# Patient Record
Sex: MALE | Race: BLACK OR AFRICAN AMERICAN | NOT HISPANIC OR LATINO | URBAN - METROPOLITAN AREA
[De-identification: names, ages, dates, MRNs, and addresses within clinical notes are randomized per-mention and may not be internally consistent; named-entity substitution may affect disease eponyms.]

---

## 2018-09-24 ENCOUNTER — EMERGENCY (EMERGENCY)
Facility: HOSPITAL | Age: 53
LOS: 0 days | Discharge: HOME | End: 2018-09-24
Attending: EMERGENCY MEDICINE
Payer: MEDICAID

## 2018-09-24 VITALS
SYSTOLIC BLOOD PRESSURE: 148 MMHG | DIASTOLIC BLOOD PRESSURE: 109 MMHG | HEIGHT: 72 IN | RESPIRATION RATE: 16 BRPM | TEMPERATURE: 98 F | HEART RATE: 87 BPM | WEIGHT: 270.07 LBS | OXYGEN SATURATION: 97 %

## 2018-09-24 DIAGNOSIS — M54.5 LOW BACK PAIN: ICD-10-CM

## 2018-09-24 DIAGNOSIS — M25.571 PAIN IN RIGHT ANKLE AND JOINTS OF RIGHT FOOT: ICD-10-CM
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
## 2018-09-24 DIAGNOSIS — S39.012A STRAIN OF MUSCLE, FASCIA AND TENDON OF LOWER BACK, INITIAL ENCOUNTER: ICD-10-CM

## 2018-09-24 PROCEDURE — 99283 EMERGENCY DEPT VISIT LOW MDM: CPT

## 2018-09-24 PROCEDURE — 72100 X-RAY EXAM L-S SPINE 2/3 VWS: CPT | Mod: 26

## 2018-09-24 PROCEDURE — 73610 X-RAY EXAM OF ANKLE: CPT | Mod: 26,RT

## 2018-09-24 RX ORDER — IBUPROFEN 200 MG
600 TABLET ORAL ONCE
Refills: 0 | Status: COMPLETED | OUTPATIENT
Start: 2018-09-24 | End: 2018-09-24

## 2018-09-24 RX ADMIN — Medication 600 MILLIGRAM(S): at 16:08

## 2018-09-24 NOTE — ED PROVIDER NOTE - MUSCULOSKELETAL, MLM
Spine appears normal, range of motion is not limited, right lateral ankle mild tenderness, right distal lateral lumbar tenderness

## 2018-09-24 NOTE — ED ADULT TRIAGE NOTE - CHIEF COMPLAINT QUOTE
slipped and fell in water in [facility] Bathroom, twisted ankle and c/o right ankle and right flank and back pains.

## 2021-12-28 ENCOUNTER — EMERGENCY (EMERGENCY)
Facility: HOSPITAL | Age: 56
LOS: 1 days | Discharge: HOME | End: 2021-12-28
Attending: EMERGENCY MEDICINE | Admitting: EMERGENCY MEDICINE
Payer: COMMERCIAL

## 2021-12-28 VITALS
OXYGEN SATURATION: 100 % | RESPIRATION RATE: 20 BRPM | WEIGHT: 255.07 LBS | DIASTOLIC BLOOD PRESSURE: 98 MMHG | SYSTOLIC BLOOD PRESSURE: 149 MMHG | TEMPERATURE: 98 F | HEART RATE: 66 BPM

## 2021-12-28 PROCEDURE — 99284 EMERGENCY DEPT VISIT MOD MDM: CPT

## 2021-12-28 NOTE — ED PROVIDER NOTE - ATTENDING CONTRIBUTION TO CARE
Pt was seen and evaluated by me. Pt is a 57 y/o male with PMHx of HTN who presented to the ED for body aches X 2 days. Pt states he was exposed to someone who tested + to COVID. Pt states since having some weakness and body aches. Pt denies any nausea, vomiting, SOB, chest pain, or abd pain. Denies any diarrhea. Lungs CTA b/l. RRR. Abd soft, non-tender. FROM of b/l UE and LE.  Concern for URI/COVID  Swab

## 2021-12-28 NOTE — ED PROVIDER NOTE - NSFOLLOWUPINSTRUCTIONS_ED_ALL_ED_FT
Follow up with your Primary Medical Doctor.  Rest.  Drink plenty of fluids.  Take Tylenol 650mg orally every 6 hours as needed for fever.  Self quarantine as discussed per CDC guidelines if COVID positive.   It is very important to be diligent about hand washing & hygiene to avoid spreading the illness to others.  Return to the ER for any persistent/worsening or new symptoms chest pain, shortness of breath, unable to eat/drink high fever or any concerning symptoms.  See attached COVID pamphlet.

## 2021-12-28 NOTE — ED PROVIDER NOTE - PATIENT PORTAL LINK FT
You can access the FollowMyHealth Patient Portal offered by [facility] by registering at the following website: [email or web address]. By joining [facility]’s FollowMyHealth portal, you will also be able to view your health information using other applications (apps) compatible with our system.

## 2021-12-28 NOTE — ED PROVIDER NOTE - OBJECTIVE STATEMENT
57 y/o male with a hx of HTN presents to the ER c/o 2 days of fatigue and bodyaches.  Pt states he was exposed to someone who is COVID+.  Pt denies chest pain, cough, fevers, sob, nausea, vomiting, abdominal pain. 55 y/o male with a hx of HTN presents to the ER c/o 2 days of fatigue and body aches.  Pt states he was exposed to someone who is COVID+.  Pt denies chest pain, cough, fevers, sob, nausea, vomiting, abdominal pain. Pt is vaccinated.

## 2021-12-28 NOTE — ED PROVIDER NOTE - CPE EDP PSYCH NORM
Chronic problem. Will continue chronic medication(s), Wellbutrin and Chantix and monitor for any changes, adjusting as needed.        normal...

## 2021-12-28 NOTE — ED ADULT TRIAGE NOTE - CHIEF COMPLAINT QUOTE
onset 2 days ago: fever, runny nose, headache, slight sore throat. unknown exposure to COVID    PMH includes HTN

## 2021-12-28 NOTE — ED PROVIDER NOTE - CLINICAL SUMMARY MEDICAL DECISION MAKING FREE TEXT BOX
57 y/o male with a hx of HTN presents to the ER c/o 2 days of fatigue and bodyaches.  Pt states he was exposed to someone who is COVID+. Pt is well appearing, NAD, lungs CTA, likely viral, will obtain COVID swab.

## 2021-12-29 LAB — SARS-COV-2 RNA SPEC QL NAA+PROBE: SIGNIFICANT CHANGE UP

## 2022-04-13 ENCOUNTER — EMERGENCY (EMERGENCY)
Facility: HOSPITAL | Age: 57
LOS: 1 days | Discharge: HOME | End: 2022-04-13
Attending: STUDENT IN AN ORGANIZED HEALTH CARE EDUCATION/TRAINING PROGRAM | Admitting: STUDENT IN AN ORGANIZED HEALTH CARE EDUCATION/TRAINING PROGRAM
Payer: MEDICAID

## 2022-04-13 VITALS
HEART RATE: 92 BPM | TEMPERATURE: 98 F | OXYGEN SATURATION: 100 % | DIASTOLIC BLOOD PRESSURE: 99 MMHG | RESPIRATION RATE: 18 BRPM | SYSTOLIC BLOOD PRESSURE: 130 MMHG

## 2022-04-13 VITALS
SYSTOLIC BLOOD PRESSURE: 144 MMHG | OXYGEN SATURATION: 100 % | RESPIRATION RATE: 18 BRPM | HEART RATE: 78 BPM | DIASTOLIC BLOOD PRESSURE: 104 MMHG

## 2022-04-13 PROCEDURE — 99284 EMERGENCY DEPT VISIT MOD MDM: CPT

## 2022-04-13 NOTE — ED ADULT NURSE NOTE - NS ED NURSE ELOPE COMMENTS
When entering the room to place IV for blood draw, pt was not there, empty stretcher. When entering the room to place IV for blood draw, pt was not to be found. There was an empty stretcher in the room.

## 2022-04-13 NOTE — ED ADULT NURSE NOTE - OBJECTIVE STATEMENT
received pt to rm 9, A&xo4, amb. 57y/o male hx of HTN complaining of insomnia. pt states he had spinal fusion surgery and since then has not slept a full night sleep in a month. Sleep approx 1-2 hours a night. Denies weakness, dizziness, abdominal pain, fever/chills, nausea/vomiting, chest pain.

## 2022-04-13 NOTE — ED ADULT NURSE NOTE - CHIEF COMPLAINT QUOTE
Pt arrives with wife c/o sob and not sleeping x1 month. Wife reports tonight "pt appeared not like himself more tired". Pt offers no other complaints. Denies fever, chills, psych hx/daily meds, denies drugs/ETOH. PMHX HTN.

## 2022-04-13 NOTE — ED ADULT TRIAGE NOTE - CHIEF COMPLAINT QUOTE
Pt arrives with wife c/o sob and not sleeping x1 month. Wife reports tonight "pt appeared more withdrawn, not like himself". Pt offers no other complaints. Denies fever, chills, psych hx/daily meds, denies drugs/ETOH. PMHX HTN. Pt arrives with wife c/o sob and not sleeping x1 month. Wife reports tonight "pt appeared not like himself more tired". Pt offers no other complaints. Denies fever, chills, psych hx/daily meds, denies drugs/ETOH. PMHX HTN.

## 2022-04-13 NOTE — ED ADULT NURSE NOTE - NSIMPLEMENTINTERV_GEN_ALL_ED
Implemented All Universal Safety Interventions:  [place] to call system. Call bell, personal items and telephone within reach. Instruct patient to call for assistance. Room bathroom lighting operational. Non-slip footwear when patient is off stretcher. Physically safe environment: no spills, clutter or unnecessary equipment. Stretcher in lowest position, wheels locked, appropriate side rails in place.

## 2022-04-14 NOTE — ED PROVIDER NOTE - OBJECTIVE STATEMENT
57 yo M with pmh of HTN, lumbar fusion 1 month ago presenting with insomnia. Patient has had chronic insomnia over many years but since coming home from hospital after surgery 1 month ago has not been able to sleep. States that he sleeps 1-2 hours a day and it is debilitating. Because of this presented today. Noticed that he feels mildly sob when he paces at night when he cannot sleep but otherwise denies LE swelling, chest pain, n/v/d. Is requesting medication for sleep.

## 2022-04-14 NOTE — ED PROVIDER NOTE - NS ED ROS FT
Gen: Denies fever, chills, generalized weakness  CV: Denies chest pain, palpitations  HEENT: Denies neck pain, headache, vision changes  Skin: Denies rash, erythema, color changes  Resp: + mild SOB, denies cough  Endo: Denies sensitivity to heat, cold, increased urination  GI: Denies constipation, nausea, vomiting, diarrhea  Msk: Denies back pain, LE swelling, extremity pain  : Denies dysuria, increased frequency  Neuro: Denies LOC, focal weakness, numbness, tingling  Psych: Denies hx of psych, SI, HI

## 2022-04-14 NOTE — ED PROVIDER NOTE - ATTENDING CONTRIBUTION TO CARE
Dr. Mcbride: This patient was evaluated by the resident and workup was initiated prior to my arrival in the ED. This patient was never seen nor evaluated by myself. Patient reported to have ambulated without assistance from the ED. Multiple attempts made to call the patient over head without response. Patient eloped from the emergency department prior to evaluation.

## 2022-04-14 NOTE — ED PROVIDER NOTE - CLINICAL SUMMARY MEDICAL DECISION MAKING FREE TEXT BOX
Joseph Frankel PGY3: Patient with chronic insomnia, worse in past month. VSS. Patient looks well and is non toxic appearing. PE as above. Likely related to chronic insomnia. However consider underlying metabolic etiologies which may contribute to patient's condition. Patient does not appear SOB or fluid overloaded. Will get labs, ecg, cxr, and reassess. If no etiology is found will likely require outpatient follow up with PMD and sleep specialist.

## 2022-04-14 NOTE — ED PROVIDER NOTE - PROGRESS NOTE DETAILS
Joseph Frankel PGY3: Patient eloped without being seen by attending physician. Patient was ambulatory without assistance. Attempted to contact patient multiple times, but unsuccessful.

## 2022-04-14 NOTE — ED PROVIDER NOTE - PHYSICAL EXAMINATION
Gen: Alert and oriented. Lying comfortably in bed. Answering questions appropriately  HEENT: extra occular movements intact, no nasal discharge, mucous membranes moist  CV: Regular rate and rhythm, +S1/S2, no murmurs/rubs/gallops,   Resp: Clear to ausculation bilaterally, no wheezes/rhonchi/rales  GI: Abdomen soft non-distended, non tender to palpation, no masses  MSK: No open wounds, no bruising, no LE edema, Homans sign negative bl  Neuro: A&Ox4, following commands, moving all four extremities spontaneously, ambulating without assistance  Psych: appropriate mood

## 2022-12-22 ENCOUNTER — INPATIENT (INPATIENT)
Facility: HOSPITAL | Age: 57
LOS: 4 days | Discharge: HOME | DRG: 67 | End: 2022-12-27
Attending: NEUROLOGICAL SURGERY | Admitting: NEUROLOGICAL SURGERY
Payer: MEDICAID

## 2022-12-22 VITALS
RESPIRATION RATE: 18 BRPM | OXYGEN SATURATION: 98 % | TEMPERATURE: 99 F | DIASTOLIC BLOOD PRESSURE: 81 MMHG | HEART RATE: 76 BPM | SYSTOLIC BLOOD PRESSURE: 131 MMHG

## 2022-12-22 DIAGNOSIS — Z98.1 ARTHRODESIS STATUS: Chronic | ICD-10-CM

## 2022-12-22 DIAGNOSIS — K62.89 OTHER SPECIFIED DISEASES OF ANUS AND RECTUM: ICD-10-CM

## 2022-12-22 DIAGNOSIS — I10 ESSENTIAL (PRIMARY) HYPERTENSION: ICD-10-CM

## 2022-12-22 DIAGNOSIS — I63.9 CEREBRAL INFARCTION, UNSPECIFIED: ICD-10-CM

## 2022-12-22 DIAGNOSIS — I65.01 OCCLUSION AND STENOSIS OF RIGHT VERTEBRAL ARTERY: ICD-10-CM

## 2022-12-22 DIAGNOSIS — Z29.9 ENCOUNTER FOR PROPHYLACTIC MEASURES, UNSPECIFIED: ICD-10-CM

## 2022-12-22 LAB
A1C WITH ESTIMATED AVERAGE GLUCOSE RESULT: 5.9 % — HIGH (ref 4–5.6)
ANION GAP SERPL CALC-SCNC: 10 MMOL/L — SIGNIFICANT CHANGE UP (ref 5–17)
APTT BLD: 26.3 SEC — LOW (ref 27.5–35.5)
BUN SERPL-MCNC: 9 MG/DL — SIGNIFICANT CHANGE UP (ref 7–23)
CALCIUM SERPL-MCNC: 9 MG/DL — SIGNIFICANT CHANGE UP (ref 8.4–10.5)
CHLORIDE SERPL-SCNC: 103 MMOL/L — SIGNIFICANT CHANGE UP (ref 96–108)
CHOLEST SERPL-MCNC: 173 MG/DL — SIGNIFICANT CHANGE UP
CO2 SERPL-SCNC: 23 MMOL/L — SIGNIFICANT CHANGE UP (ref 22–31)
CREAT SERPL-MCNC: 1.02 MG/DL — SIGNIFICANT CHANGE UP (ref 0.5–1.3)
EGFR: 86 ML/MIN/1.73M2 — SIGNIFICANT CHANGE UP
ESTIMATED AVERAGE GLUCOSE: 123 MG/DL — HIGH (ref 68–114)
GLUCOSE SERPL-MCNC: 94 MG/DL — SIGNIFICANT CHANGE UP (ref 70–99)
HCT VFR BLD CALC: 36.9 % — LOW (ref 39–50)
HDLC SERPL-MCNC: 36 MG/DL — LOW
HGB BLD-MCNC: 12.6 G/DL — LOW (ref 13–17)
INR BLD: 1.27 — HIGH (ref 0.88–1.16)
LIPID PNL WITH DIRECT LDL SERPL: 118 MG/DL — HIGH
MCHC RBC-ENTMCNC: 29 PG — SIGNIFICANT CHANGE UP (ref 27–34)
MCHC RBC-ENTMCNC: 34.1 GM/DL — SIGNIFICANT CHANGE UP (ref 32–36)
MCV RBC AUTO: 85 FL — SIGNIFICANT CHANGE UP (ref 80–100)
NON HDL CHOLESTEROL: 137 MG/DL — HIGH
NRBC # BLD: 0 /100 WBCS — SIGNIFICANT CHANGE UP (ref 0–0)
PLATELET # BLD AUTO: 173 K/UL — SIGNIFICANT CHANGE UP (ref 150–400)
POTASSIUM SERPL-MCNC: 3.9 MMOL/L — SIGNIFICANT CHANGE UP (ref 3.5–5.3)
POTASSIUM SERPL-SCNC: 3.9 MMOL/L — SIGNIFICANT CHANGE UP (ref 3.5–5.3)
PROTHROM AB SERPL-ACNC: 15.1 SEC — HIGH (ref 10.5–13.4)
RBC # BLD: 4.34 M/UL — SIGNIFICANT CHANGE UP (ref 4.2–5.8)
RBC # FLD: 14 % — SIGNIFICANT CHANGE UP (ref 10.3–14.5)
SARS-COV-2 RNA SPEC QL NAA+PROBE: NEGATIVE — SIGNIFICANT CHANGE UP
SODIUM SERPL-SCNC: 136 MMOL/L — SIGNIFICANT CHANGE UP (ref 135–145)
TRIGL SERPL-MCNC: 95 MG/DL — SIGNIFICANT CHANGE UP
WBC # BLD: 5.28 K/UL — SIGNIFICANT CHANGE UP (ref 3.8–10.5)
WBC # FLD AUTO: 5.28 K/UL — SIGNIFICANT CHANGE UP (ref 3.8–10.5)

## 2022-12-22 PROCEDURE — 93925 LOWER EXTREMITY STUDY: CPT | Mod: 26

## 2022-12-22 PROCEDURE — 99233 SBSQ HOSP IP/OBS HIGH 50: CPT | Mod: GC

## 2022-12-22 PROCEDURE — 99254 IP/OBS CNSLTJ NEW/EST MOD 60: CPT

## 2022-12-22 PROCEDURE — 93306 TTE W/DOPPLER COMPLETE: CPT | Mod: 26

## 2022-12-22 PROCEDURE — 73706 CT ANGIO LWR EXTR W/O&W/DYE: CPT | Mod: 26,50

## 2022-12-22 PROCEDURE — 99233 SBSQ HOSP IP/OBS HIGH 50: CPT

## 2022-12-22 PROCEDURE — 99221 1ST HOSP IP/OBS SF/LOW 40: CPT | Mod: GC

## 2022-12-22 PROCEDURE — 70450 CT HEAD/BRAIN W/O DYE: CPT | Mod: 26

## 2022-12-22 PROCEDURE — 93970 EXTREMITY STUDY: CPT | Mod: 26

## 2022-12-22 PROCEDURE — 99252 IP/OBS CONSLTJ NEW/EST SF 35: CPT | Mod: GC

## 2022-12-22 PROCEDURE — 76376 3D RENDER W/INTRP POSTPROCES: CPT | Mod: 26

## 2022-12-22 PROCEDURE — 99223 1ST HOSP IP/OBS HIGH 75: CPT

## 2022-12-22 RX ORDER — ASPIRIN/CALCIUM CARB/MAGNESIUM 324 MG
81 TABLET ORAL DAILY
Refills: 0 | Status: DISCONTINUED | OUTPATIENT
Start: 2022-12-22 | End: 2022-12-25

## 2022-12-22 RX ORDER — ASPIRIN/CALCIUM CARB/MAGNESIUM 324 MG
325 TABLET ORAL DAILY
Refills: 0 | Status: DISCONTINUED | OUTPATIENT
Start: 2022-12-22 | End: 2022-12-22

## 2022-12-22 RX ORDER — POLYETHYLENE GLYCOL 3350 17 G/17G
17 POWDER, FOR SOLUTION ORAL DAILY
Refills: 0 | Status: DISCONTINUED | OUTPATIENT
Start: 2022-12-22 | End: 2022-12-27

## 2022-12-22 RX ORDER — SOD SULF/SODIUM/NAHCO3/KCL/PEG
4000 SOLUTION, RECONSTITUTED, ORAL ORAL ONCE
Refills: 0 | Status: COMPLETED | OUTPATIENT
Start: 2022-12-22 | End: 2022-12-22

## 2022-12-22 RX ORDER — ONDANSETRON 8 MG/1
4 TABLET, FILM COATED ORAL EVERY 6 HOURS
Refills: 0 | Status: DISCONTINUED | OUTPATIENT
Start: 2022-12-22 | End: 2022-12-27

## 2022-12-22 RX ORDER — ACETAMINOPHEN 500 MG
650 TABLET ORAL EVERY 6 HOURS
Refills: 0 | Status: DISCONTINUED | OUTPATIENT
Start: 2022-12-22 | End: 2022-12-27

## 2022-12-22 RX ORDER — SENNA PLUS 8.6 MG/1
2 TABLET ORAL AT BEDTIME
Refills: 0 | Status: DISCONTINUED | OUTPATIENT
Start: 2022-12-22 | End: 2022-12-27

## 2022-12-22 RX ORDER — CLOPIDOGREL BISULFATE 75 MG/1
75 TABLET, FILM COATED ORAL DAILY
Refills: 0 | Status: DISCONTINUED | OUTPATIENT
Start: 2022-12-22 | End: 2022-12-22

## 2022-12-22 RX ORDER — SODIUM CHLORIDE 9 MG/ML
1000 INJECTION INTRAMUSCULAR; INTRAVENOUS; SUBCUTANEOUS
Refills: 0 | Status: DISCONTINUED | OUTPATIENT
Start: 2022-12-22 | End: 2022-12-22

## 2022-12-22 RX ORDER — AMLODIPINE BESYLATE 2.5 MG/1
1 TABLET ORAL
Qty: 0 | Refills: 0 | DISCHARGE

## 2022-12-22 RX ORDER — ENOXAPARIN SODIUM 100 MG/ML
40 INJECTION SUBCUTANEOUS
Refills: 0 | Status: DISCONTINUED | OUTPATIENT
Start: 2022-12-22 | End: 2022-12-22

## 2022-12-22 RX ORDER — ATORVASTATIN CALCIUM 80 MG/1
80 TABLET, FILM COATED ORAL AT BEDTIME
Refills: 0 | Status: DISCONTINUED | OUTPATIENT
Start: 2022-12-22 | End: 2022-12-27

## 2022-12-22 RX ORDER — HEPARIN SODIUM 5000 [USP'U]/ML
1200 INJECTION INTRAVENOUS; SUBCUTANEOUS
Qty: 25000 | Refills: 0 | Status: DISCONTINUED | OUTPATIENT
Start: 2022-12-22 | End: 2022-12-23

## 2022-12-22 RX ADMIN — POLYETHYLENE GLYCOL 3350 17 GRAM(S): 17 POWDER, FOR SOLUTION ORAL at 12:18

## 2022-12-22 RX ADMIN — SENNA PLUS 2 TABLET(S): 8.6 TABLET ORAL at 21:48

## 2022-12-22 RX ADMIN — HEPARIN SODIUM 1200 UNIT(S)/HR: 5000 INJECTION INTRAVENOUS; SUBCUTANEOUS at 19:43

## 2022-12-22 RX ADMIN — Medication 81 MILLIGRAM(S): at 18:26

## 2022-12-22 RX ADMIN — Medication 4000 MILLILITER(S): at 18:29

## 2022-12-22 RX ADMIN — ATORVASTATIN CALCIUM 80 MILLIGRAM(S): 80 TABLET, FILM COATED ORAL at 21:48

## 2022-12-22 RX ADMIN — Medication 325 MILLIGRAM(S): at 12:18

## 2022-12-22 NOTE — CONSULT NOTE ADULT - SUBJECTIVE AND OBJECTIVE BOX
Surgery Consult Note    HPI:  58yo M with PMHx of HTN, previous lumbar fusion (March 2022) who originally was BIBEMS to [facility] on 12/16/22 after an episode of extreme dizziness and gait instability that caused him to fall, unclear whether he hit his head or +LOC, but then describes episode of aphasia, contracture of bilateral hands and confusion. He reports that he lives in the basement of his apartment building and that his upstairs neighbor heard him fall and called an ambulance. Episode lasted approximately about 1-1.5 hours. On arrival to [place], CTH was negative, but MRI demonstrated multiple acute cerebellar ischemic infarcts. He was then started on aspirin, plavix and atorvastatin. 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 Patient was transferred to [facility] for further workup for possible complex bypass surgery. Of note, patient endorses that he had a brother who passed away from complications of multiple strokes but otherwise denies family history of bleeding/clotting disorders.     Denies previous episodes of gait instability or falls, visual changes, syncopal episodes.  (22 Dec 2022 04:32)      Attending:  Dr. Lynn    Surgery Addendum: Patient with history as stated above. 58yo M with PMHx of HTN, previous lumbar fusion (March 2022) who originally was BIBEMS to [facility] on 12/16/22 after an episode of extreme dizziness and gait instability that caused him to fall, admitted to [facility] for further workup and found to have b/l R>L acute/subacute cerebellar infarcts. Primary team was planning to use anterior tibial artery as graft for the cerebral bypass procedure next week. CTA of leg showed incidental findings of occlusion of proximal left anterior tibialis artery and stenosis of right anterior tibialis. Vascular consulted for evaluation of anterior tibial as a graft for a cerebral bypass. Patient denies any pain with walking or pain at night. Denies any history of smoking tobacco.       PAST MEDICAL & SURGICAL HISTORY:  HTN (hypertension)      History of lumbar fusion          MEDICATIONS  (STANDING):  aspirin enteric coated 325 milliGRAM(s) Oral daily  atorvastatin 80 milliGRAM(s) Oral at bedtime  enoxaparin Injectable 40 milliGRAM(s) SubCutaneous <User Schedule>  polyethylene glycol 3350 17 Gram(s) Oral daily  senna 2 Tablet(s) Oral at bedtime    MEDICATIONS  (PRN):  acetaminophen     Tablet .. 650 milliGRAM(s) Oral every 6 hours PRN Temp greater or equal to 38.5C (101.3F), Moderate Pain (4 - 6)  ondansetron Injectable 4 milliGRAM(s) IV Push every 6 hours PRN Nausea and/or Vomiting      Allergies    No Known Allergies    Intolerances        SOCIAL HISTORY:    FAMILY HISTORY:  Family history stroke in brother (Sibling)        Vital Signs Last 24 Hrs  T(C): 36.6 (22 Dec 2022 12:18), Max: 37.5 (22 Dec 2022 03:46)  T(F): 97.9 (22 Dec 2022 12:18), Max: 99.5 (22 Dec 2022 03:46)  HR: 80 (22 Dec 2022 12:18) (76 - 90)  BP: 129/71 (22 Dec 2022 12:18) (129/71 - 133/89)  BP(mean): --  RR: 18 (22 Dec 2022 12:18) (16 - 18)  SpO2: 98% (22 Dec 2022 12:18) (98% - 98%)    Parameters below as of 22 Dec 2022 12:18  Patient On (Oxygen Delivery Method): room air        I&O's Summary    21 Dec 2022 07:01  -  22 Dec 2022 07:00  --------------------------------------------------------  IN: 0 mL / OUT: 400 mL / NET: -400 mL        Physical Exam:  General: NAD, resting comfortably  HEENT: NC/AT, EOMI, normal hearing  Pulmonary: normal resp effort  Cardiovascular: NSR  Abdominal: soft, ND/NT  Extremities: WWP, normal strength, no clubbing/cyanosis/edema  Neuro: A/O x 3, CNs II-XII grossly intact, normal sensation, no focal deficits  Pulses: palpable distal pulses    Lines/drains/tubes:    LABS:                        12.6   5.28  )-----------( 173      ( 22 Dec 2022 05:08 )             36.9     12-22    136  |  103  |  9   ----------------------------<  94  3.9   |  23  |  1.02    Ca    9.0      22 Dec 2022 05:08      PT/INR - ( 22 Dec 2022 05:08 )   PT: 15.1 sec;   INR: 1.27          PTT - ( 22 Dec 2022 05:08 )  PTT:26.3 sec    CAPILLARY BLOOD GLUCOSE            Cultures:      RADIOLOGY & ADDITIONAL STUDIES:

## 2022-12-22 NOTE — SPEECH LANGUAGE PATHOLOGY EVALUATION - SLP PERTINENT HISTORY OF CURRENT PROBLEM
56yo M with PMHx of HTN, previous lumbar fusion (March 2022) who originally was BIBEMS to [facility] on 12/16/22 after an episode of extreme dizziness and gait instability that caused him to fall. MRI demonstrated multiple acute cerebellar ischemic infarcts. 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 Patient was transferred to [facility] for further workup for possible complex bypass surgery.

## 2022-12-22 NOTE — H&P ADULT - NSICDXFAMILYHX_GEN_ALL_CORE_FT
FAMILY HISTORY:  Sibling  Still living? No  Family history stroke in brother, Age at diagnosis: Age Unknown

## 2022-12-22 NOTE — CONSULT NOTE ADULT - ASSESSMENT
57M PMHx HTN, previous lumbar fusion (March 2022) who originally was BIBEMS to [facility] on 12/16/22 after an episode of extreme dizziness and gait instability that caused him to fall, and an episode of aphasia, contracture of bilateral hands and confusion. 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 Workup for bypass notable for large pelvic mass, suspicious for GIST. GI consulted for consideration of endoscopic evaluation.     No previous colonoscopy evaluation with no familial history of colorectal or GI related malignancy. Has remained asymptomatic with no reported unintentional weight loss, abdominal pain, bloody or black stools or changes in stool caliber.     ddx includes colorectal cancer (risk factors including smoking history (social smoker) and race () vs GIST.    #Rectal mass  -Obtain CT chest, abdomen/pelvis with IV contrast to assess extent of disease  -Obtain CEA, Ca 19-9  -Will tentatively plan for colonoscopy tomorrow, 12/23/22  -CLD today  -Please order for GoLytely 4L with plan to start prep @ 6 pm (12/22)  -NPO after MN, except for medications with sips of water  -Please obtain AM Coags  -Please consult colorectal surgery (Dr Barfield)    Case discussed with c attending and primary team.     Imelda Lizama DO  Gastroenterology Fellow  Pager: [phone]   57M PMHx HTN, previous lumbar fusion (March 2022) who originally was BIBEMS to [facility] on 12/16/22 after an episode of extreme dizziness and gait instability that caused him to fall, and an episode of aphasia, contracture of bilateral hands and confusion. 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 Workup for bypass notable for large pelvic mass, suspicious for GIST. GI consulted for consideration of endoscopic evaluation.     No previous colonoscopy evaluation with no familial history of colorectal or GI related malignancy. Has remained asymptomatic with no reported unintentional weight loss, abdominal pain, bloody or black stools or changes in stool caliber.     ddx includes colorectal cancer (risk factors including smoking history (social smoker) and race () vs GIST.    #Rectal mass  -Obtain CT chest, abdomen/pelvis with IV contrast to assess extent of disease  -Obtain CEA, Ca 19-9  -Will tentatively plan for colonoscopy tomorrow, 12/23/22  -CLD today  -Please order for GoLytely 4L with plan to start prep @ 6 pm (12/22)  -NPO after MN, except for medications with sips of water  -Please obtain AM Coags  -Please hold heparin gtt @ 2 am, 12/23  -Please consult colorectal surgery (Dr Barfield)    Case discussed with [facility] attending and primary team.     Imelda Lizama DO  Gastroenterology Fellow  Pager: [phone]

## 2022-12-22 NOTE — H&P ADULT - PROBLEM SELECTOR PLAN 1
- Admit to tele  - Neuro/vitals q4 hours  - ASA 325mg/plavix 75mg  - -170  - MR Nova pending   - CT Angiogram LE pending (to assess for anterior tibial graft)  - Repeat MRI brain? (poor quality from OSH)  - LE dopplers pending

## 2022-12-22 NOTE — CONSULT NOTE ADULT - PROBLEM SELECTOR RECOMMENDATION 2
Stroke core measures  - c/w ASA/statin as above  - plavix held due to possible procedure  - f/u Echo w/bubble  - possibly hypercoagulable state due to malignancy, see plan as below

## 2022-12-22 NOTE — CONSULT NOTE ADULT - SUBJECTIVE AND OBJECTIVE BOX
HPI:  58yo M with PMHx of HTN, previous lumbar fusion (March 2022) who originally was BIBEMS to [facility] on 12/16/22 after an episode of extreme dizziness and gait instability that caused him to fall, unclear whether he hit his head or +LOC, but then describes episode of aphasia, contracture of bilateral hands and confusion. He reports that he lives in the basement of his apartment building and that his upstairs neighbor heard him fall and called an ambulance. Episode lasted approximately about 1-1.5 hours. On arrival to [place], CTH was negative, but MRI demonstrated multiple acute cerebellar ischemic infarcts. He was then started on aspirin, plavix and atorvastatin. 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 Patient was transferred to [facility] for further workup for possible complex bypass surgery. Of note, patient endorses that he had a brother who passed away from complications of multiple strokes but otherwise denies family history of bleeding/clotting disorders.     Denies previous episodes of gait instability or falls, visual changes, syncopal episodes.  (22 Dec 2022 04:32)      GENERAL SURGERY ADDENDUM: HPI as above. 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 Surgery consulted for incidental finding of rectal mass on CTA LE which demonstrated a posterior pelvic mass, involving the right mid, low rectal wall and extending to the right ischioanal fossa, 7.0 x 6.6 x 9.5 cm (5, 50; 9, 76; 10, 74), probably tumor submucosal in origin, possibly GIST. On examination, pt reports feeling well and was in his normal state of health prior to his neurologic event. Denies recent abdominal pain, nausea, or vomiting. Denies recent weight loss, blood in his stool, or changes in stool caliber. States that he is a current smoker cigarettes and marijuana), but denies alcohol intake. Denies family history of IBS, Crohns, UC, or colon cancer. Of note, pt has never had a colonoscopy.    acetaminophen     Tablet .. 650 milliGRAM(s) Oral every 6 hours PRN  aspirin enteric coated 81 milliGRAM(s) Oral daily  atorvastatin 80 milliGRAM(s) Oral at bedtime  heparin  Infusion. 1200 Unit(s)/Hr IV Continuous <Continuous>  ondansetron Injectable 4 milliGRAM(s) IV Push every 6 hours PRN  polyethylene glycol 3350 17 Gram(s) Oral daily  senna 2 Tablet(s) Oral at bedtime      Allergies    No Known Allergies    Intolerances        ICU Vital Signs Last 24 Hrs  T(F): 99 (12-22-22 @ 21:00), Max: 99.5 (12-22-22 @ 03:46)  HR: 84 (12-22-22 @ 20:19) (76 - 90)  BP: 129/82 (12-22-22 @ 20:19) (129/71 - 142/90)  BP(mean): 99 (12-22-22 @ 20:19) (99 - 99)  ABP: --  RR: 18 (12-22-22 @ 20:19) (16 - 18)  SpO2: 99% (12-22-22 @ 20:19) (98% - 99%)    General: AAOx3, NAD, WDWN, laying comfortably in bed  Cardio: S1,S2, No MRG, RRR  Pulm: Lungs bilaterally clear to auscultation  Abdomen: soft, ntnd, no rebound, no guarding  JOSSIE: refused in setting of planned cscope in the AM  Extremities: WWP, peripheral pulses appreciated    LABS:    12-22    136  |  103  |  9   ----------------------------<  94  3.9   |  23  |  1.02    Ca    9.0      22 Dec 2022 05:08                          12.6   5.28  )-----------( 173      ( 22 Dec 2022 05:08 )             36.9   PT/INR - ( 22 Dec 2022 05:08 )   PT: 15.1 sec;   INR: 1.27          PTT - ( 22 Dec 2022 05:08 )  PTT:26.3 sec  CAPILLARY BLOOD GLUCOSE      ACC: [id number] EXAM: CT ANGIO LWR EXT (W)AW IC BI    PROCEDURE DATE: 12/22/2022        INTERPRETATION: Exam: CT angiography bilateral lower extremities    INDICATION:    TECHNIQUE: CT images of the bilateral lower extremities were obtained from bilateral iliac crest down to the foot in arterial phase followed by CT legs from mid thigh to toes in venous phase. Multiplanar reformats were performed.    FINDINGS:  Vascular:  Right lower extremity: No arterial occlusion. Three-vessel runoff down to the foot. Dorsalis pedis visualized. Multifocal stenosis anterior tibialis artery.  Left lower extremity: Occlusion of proximal anterior tibialis artery. Two-vessel runoff down to the foot. Distal reconstitution of flow in diminutive dorsalis pedis.    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  No adenopathy.    Unremarkable left iliac fossa renal allograft.    IMPRESSION:  1. 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 Distal reconstitution of flow in diminutive left dorsalis pedis.  2. No arterial occlusion in the right lower extremity, three-vessel runoff down to the foot. Multifocal right anterior tibialis artery stenosis.  3. 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 Consider GI consultation and pelvic MRI.

## 2022-12-22 NOTE — H&P ADULT - NSHPSOCIALHISTORY_GEN_ALL_CORE
Patient lives alone in a basement apartment that has 6 steps to walk down. He is a retired long distance . Has a spouse who he does not live with.

## 2022-12-22 NOTE — PHYSICAL THERAPY INITIAL EVALUATION ADULT - PERTINENT HX OF CURRENT PROBLEM, REHAB EVAL
2022 Care Everywhere updates requested and reviewed.  Immunizations reconciled. Media reports reviewed.  Duplicate HM overrides and  orders removed.  Overdue HM topic chart audit and/or requested.  Overdue lab testing linked to upcoming lab appointments if applies.  Lab ja, and [facility] reviewed    Lab testing     DIS reviewed      Mammogram and DEXA      Health Maintenance Due   Topic Date Due    HIV Screening  Never done    DEXA Scan  Never done    Shingles Vaccine (1 of 2) Never done    COVID-19 Vaccine (3 - Booster for Pfizer series) 08/10/2021    Mammogram  10/14/2021    Colorectal Cancer Screening  2021    Influenza Vaccine (1) 2022         
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 Patient was transferred to [facility] for further workup for possible complex bypass surgery.  Hospital course complicated by finding of new rectal mass

## 2022-12-22 NOTE — CONSULT NOTE ADULT - SUBJECTIVE AND OBJECTIVE BOX
Surgery Consult Note    HPI:  58yo M with PMHx of HTN, previous lumbar fusion (March 2022) who originally was BIBEMS to [facility] on 12/16/22 after an episode of extreme dizziness and gait instability that caused him to fall, unclear whether he hit his head or +LOC, but then describes episode of aphasia, contracture of bilateral hands and confusion. He reports that he lives in the basement of his apartment building and that his upstairs neighbor heard him fall and called an ambulance. Episode lasted approximately about 1-1.5 hours. On arrival to [place], CTH was negative, but MRI demonstrated multiple acute cerebellar ischemic infarcts. He was then started on aspirin, plavix and atorvastatin. 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 Patient was transferred to [facility] for further workup for possible complex bypass surgery. Of note, patient endorses that he had a brother who passed away from complications of multiple strokes but otherwise denies family history of bleeding/clotting disorders.     Denies previous episodes of gait instability or falls, visual changes, syncopal episodes.  (22 Dec 2022 04:32)      Attending:  Dr. Willingham    Surgery Addendum: Patient with h        PAST MEDICAL & SURGICAL HISTORY:  HTN (hypertension)      History of lumbar fusion          MEDICATIONS  (STANDING):  aspirin enteric coated 325 milliGRAM(s) Oral daily  atorvastatin 80 milliGRAM(s) Oral at bedtime  enoxaparin Injectable 40 milliGRAM(s) SubCutaneous <User Schedule>  polyethylene glycol 3350 17 Gram(s) Oral daily  senna 2 Tablet(s) Oral at bedtime    MEDICATIONS  (PRN):  acetaminophen     Tablet .. 650 milliGRAM(s) Oral every 6 hours PRN Temp greater or equal to 38.5C (101.3F), Moderate Pain (4 - 6)  ondansetron Injectable 4 milliGRAM(s) IV Push every 6 hours PRN Nausea and/or Vomiting      Allergies    No Known Allergies    Intolerances        SOCIAL HISTORY:    FAMILY HISTORY:  Family history stroke in brother (Sibling)        Vital Signs Last 24 Hrs  T(C): 36.6 (22 Dec 2022 12:18), Max: 37.5 (22 Dec 2022 03:46)  T(F): 97.9 (22 Dec 2022 12:18), Max: 99.5 (22 Dec 2022 03:46)  HR: 80 (22 Dec 2022 12:18) (76 - 90)  BP: 129/71 (22 Dec 2022 12:18) (129/71 - 133/89)  BP(mean): --  RR: 18 (22 Dec 2022 12:18) (16 - 18)  SpO2: 98% (22 Dec 2022 12:18) (98% - 98%)    Parameters below as of 22 Dec 2022 12:18  Patient On (Oxygen Delivery Method): room air        I&O's Summary    21 Dec 2022 07:01  -  22 Dec 2022 07:00  --------------------------------------------------------  IN: 0 mL / OUT: 400 mL / NET: -400 mL        Physical Exam:  General: NAD, resting comfortably  HEENT: NC/AT, EOMI, normal hearing, no oral lesions, no LAD, neck supple  Pulmonary: normal resp effort, CTA-B  Cardiovascular: NSR, no murmurs  Abdominal: soft, ND/NT, no organomegaly  Extremities: WWP, normal strength, no clubbing/cyanosis/edema  Neuro: A/O x 3, CNs II-XII grossly intact, normal sensation, no focal deficits  Pulses: palpable distal pulses    Lines/drains/tubes:    LABS:                        12.6   5.28  )-----------( 173      ( 22 Dec 2022 05:08 )             36.9     12-22    136  |  103  |  9   ----------------------------<  94  3.9   |  23  |  1.02    Ca    9.0      22 Dec 2022 05:08      PT/INR - ( 22 Dec 2022 05:08 )   PT: 15.1 sec;   INR: 1.27          PTT - ( 22 Dec 2022 05:08 )  PTT:26.3 sec    CAPILLARY BLOOD GLUCOSE            Cultures:      RADIOLOGY & ADDITIONAL STUDIES:         Surgery Consult Note    HPI:  58yo M with PMHx of HTN, previous lumbar fusion (March 2022) who originally was BIBEMS to [facility] on 12/16/22 after an episode of extreme dizziness and gait instability that caused him to fall, unclear whether he hit his head or +LOC, but then describes episode of aphasia, contracture of bilateral hands and confusion. He reports that he lives in the basement of his apartment building and that his upstairs neighbor heard him fall and called an ambulance. Episode lasted approximately about 1-1.5 hours. On arrival to [place], CTH was negative, but MRI demonstrated multiple acute cerebellar ischemic infarcts. He was then started on aspirin, plavix and atorvastatin. 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 Patient was transferred to [facility] for further workup for possible complex bypass surgery. Of note, patient endorses that he had a brother who passed away from complications of multiple strokes but otherwise denies family history of bleeding/clotting disorders.     Denies previous episodes of gait instability or falls, visual changes, syncopal episodes.  (22 Dec 2022 04:32)      Attending:  Dr. Willingham    Surgery Addendum: Patient with history as stated above. 58yo M with PMHx of HTN, previous lumbar fusion (March 2022) who originally was BIBEMS to [facility] on 12/16/22 after an episode of extreme dizziness and gait instability that caused him to fall, admitted to  [facility] fro further workup. 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 Distal reconstitution of flow in diminutive left dorsalis pedis. Patient denies any pain with walking or pain at night. Denies any history of smoking tobacco.       PAST MEDICAL & SURGICAL HISTORY:  HTN (hypertension)      History of lumbar fusion          MEDICATIONS  (STANDING):  aspirin enteric coated 325 milliGRAM(s) Oral daily  atorvastatin 80 milliGRAM(s) Oral at bedtime  enoxaparin Injectable 40 milliGRAM(s) SubCutaneous <User Schedule>  polyethylene glycol 3350 17 Gram(s) Oral daily  senna 2 Tablet(s) Oral at bedtime    MEDICATIONS  (PRN):  acetaminophen     Tablet .. 650 milliGRAM(s) Oral every 6 hours PRN Temp greater or equal to 38.5C (101.3F), Moderate Pain (4 - 6)  ondansetron Injectable 4 milliGRAM(s) IV Push every 6 hours PRN Nausea and/or Vomiting      Allergies    No Known Allergies    Intolerances        SOCIAL HISTORY:    FAMILY HISTORY:  Family history stroke in brother (Sibling)        Vital Signs Last 24 Hrs  T(C): 36.6 (22 Dec 2022 12:18), Max: 37.5 (22 Dec 2022 03:46)  T(F): 97.9 (22 Dec 2022 12:18), Max: 99.5 (22 Dec 2022 03:46)  HR: 80 (22 Dec 2022 12:18) (76 - 90)  BP: 129/71 (22 Dec 2022 12:18) (129/71 - 133/89)  BP(mean): --  RR: 18 (22 Dec 2022 12:18) (16 - 18)  SpO2: 98% (22 Dec 2022 12:18) (98% - 98%)    Parameters below as of 22 Dec 2022 12:18  Patient On (Oxygen Delivery Method): room air        I&O's Summary    21 Dec 2022 07:01  -  22 Dec 2022 07:00  --------------------------------------------------------  IN: 0 mL / OUT: 400 mL / NET: -400 mL        Physical Exam:  General: NAD, resting comfortably  HEENT: NC/AT, EOMI, normal hearing, no oral lesions, no LAD, neck supple  Pulmonary: normal resp effort, CTA-B  Cardiovascular: NSR, no murmurs  Abdominal: soft, ND/NT, no organomegaly  Extremities: WWP, normal strength, no clubbing/cyanosis/edema  Neuro: A/O x 3, CNs II-XII grossly intact, normal sensation, no focal deficits  Pulses: palpable distal pulses    Lines/drains/tubes:    LABS:                        12.6   5.28  )-----------( 173      ( 22 Dec 2022 05:08 )             36.9     12-22    136  |  103  |  9   ----------------------------<  94  3.9   |  23  |  1.02    Ca    9.0      22 Dec 2022 05:08      PT/INR - ( 22 Dec 2022 05:08 )   PT: 15.1 sec;   INR: 1.27          PTT - ( 22 Dec 2022 05:08 )  PTT:26.3 sec    CAPILLARY BLOOD GLUCOSE            Cultures:      RADIOLOGY & ADDITIONAL STUDIES:

## 2022-12-22 NOTE — OCCUPATIONAL THERAPY INITIAL EVALUATION ADULT - MODALITIES TREATMENT COMMENTS
Cranial Nerves II - XII: II: PERRLA; visual fields are full to confrontation III, IV, VI: EOMI, no nystagmus appreciated V: facial sensation intact to light touch V1-V3 b/l VII: no ptosis, no facial droop, symmetric eyebrow raise and closure VIII: hearing intact to finger rub b/l  XI: head turning and shoulder shrug intact b/l XII: tongue protrusion midline.

## 2022-12-22 NOTE — H&P ADULT - NSHPLABSRESULTS_GEN_ALL_CORE
.  LABS:     MRI on 12/16/22 from [place] demonstrating multiple cerebellar infarcts R>L.                   RADIOLOGY, EKG & ADDITIONAL TESTS: Reviewed.

## 2022-12-22 NOTE — PHYSICAL THERAPY INITIAL EVALUATION ADULT - ADDITIONAL COMMENTS
Pt lives with friend in a basement,6 steps. Prior to admission, pt ambulated independently without AD.

## 2022-12-22 NOTE — OCCUPATIONAL THERAPY INITIAL EVALUATION ADULT - PERTINENT HX OF CURRENT PROBLEM, REHAB EVAL
58yo M with PMHx of HTN, previous lumbar fusion (March 2022) who originally was BIBEMS to [facility] on 12/16/22 after an episode of extreme dizziness and gait instability that caused him to fall, unclear whether he hit his head or +LOC, but then describes episode of aphasia, contracture of bilateral hands and confusion. He reports that he lives in the basement of his apartment building and that his upstairs neighbor heard him fall and called an ambulance. Episode lasted approximately about 1-1.5 hours. On arrival to [place], CTH was negative, but MRI demonstrated multiple acute cerebellar ischemic infarcts. He was then started on aspirin, plavix and atorvastatin. 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 Patient was transferred to [facility] for further workup for possible complex bypass surgery. Of note, patient endorses that he had a brother who passed away from complications of multiple strokes but otherwise denies family history of bleeding/clotting disorders.

## 2022-12-22 NOTE — CONSULT NOTE ADULT - ATTENDING COMMENTS
1.  Iritis: recurrent episode OS: Discussed that steroid and dilating drops will help with discomfort and resolve intraocular inflammation. Call office imediately with decreased vision increased pain and/or increased redness. HLA B27 positive DECREASE  pred gtts qD x 1 week then stop OS mac oct done 2 WEEKs AGO wnlrecommend establishing with PCP - recommend Dr Loretta Hendrix. Return for an appointment in 6 months CE with Dr. Vida Ford. #Rectal mass  -Obtain CT chest, abdomen/pelvis with IV contrast to assess extent of disease  -Obtain CEA, Ca 19-9  -Will tentatively plan for colonoscopy tomorrow, 12/23/22  -CLD today  -Please order for GoLytely 4L with plan to start prep @ 6 pm (12/22)  -NPO after MN, except for medications with sips of water  -Please obtain AM Coags  -Please hold heparin gtt @ 2 am, 12/23

## 2022-12-22 NOTE — H&P ADULT - HISTORY OF PRESENT ILLNESS
56yo M with PMHx of HTN, previous lumbar fusion (March 2022) who originally was BIBEMS to [facility] on 12/16/22 after an episode of extreme dizziness and gait instability that caused him to fall, unclear whether he hit his head or +LOC, but then describes episode of aphasia, contracture of bilateral hands and confusion. He reports that he lives in the basement of his apartment building and that his upstairs neighbor heard him fall and called an ambulance. Episode lasted approximately about 1-1.5 hours. On arrival to [place], CTH was negative, but MRI demonstrated multiple acute cerebellar ischemic infarcts. He was then started on aspirin, plavix and atorvastatin for stroke core measures. 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 Patient was transferred to [facility] for further workup for possible complex bypass surgery. Of note, patient endorses that he had a brother who passed away from complications of multiple strokes but otherwise denies family history of bleeding/clotting disorders.     Denies previous episodes of gait instability or falls, visual changes, syncopal episodes.  58yo M with PMHx of HTN, previous lumbar fusion (March 2022) who originally was BIBEMS to [facility] on 12/16/22 after an episode of extreme dizziness and gait instability that caused him to fall, unclear whether he hit his head or +LOC, but then describes episode of aphasia, contracture of bilateral hands and confusion. He reports that he lives in the basement of his apartment building and that his upstairs neighbor heard him fall and called an ambulance. Episode lasted approximately about 1-1.5 hours. On arrival to [place], CTH was negative, but MRI demonstrated multiple acute cerebellar ischemic infarcts. He was then started on aspirin, plavix and atorvastatin. 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 Patient was transferred to [facility] for further workup for possible complex bypass surgery. Of note, patient endorses that he had a brother who passed away from complications of multiple strokes but otherwise denies family history of bleeding/clotting disorders.     Denies previous episodes of gait instability or falls, visual changes, syncopal episodes.

## 2022-12-22 NOTE — CONSULT NOTE ADULT - SUBJECTIVE AND OBJECTIVE BOX
HPI:  56yo M with PMHx of HTN, previous lumbar fusion (March 2022) who originally was BIBEMS to [facility] on 12/16/22 after an episode of extreme dizziness and gait instability that caused him to fall, unclear whether he hit his head or +LOC, but then describes episode of aphasia, contracture of bilateral hands and confusion. He reports that he lives in the basement of his apartment building and that his upstairs neighbor heard him fall and called an ambulance. Episode lasted approximately about 1-1.5 hours. On arrival to [place], CTH was negative, but MRI demonstrated multiple acute cerebellar ischemic infarcts. He was then started on aspirin, plavix and atorvastatin. 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 Patient was transferred to [facility] for further workup for possible complex bypass surgery. Of note, patient endorses that he had a brother who passed away from complications of multiple strokes but otherwise denies family history of bleeding/clotting disorders.     Denies previous episodes of gait instability or falls, visual changes, syncopal episodes.  (22 Dec 2022 04:32)    Allergies    No Known Allergies    Intolerances      MEDICATIONS:  MEDICATIONS  (STANDING):  aspirin enteric coated 325 milliGRAM(s) Oral daily  atorvastatin 80 milliGRAM(s) Oral at bedtime  enoxaparin Injectable 40 milliGRAM(s) SubCutaneous <User Schedule>  polyethylene glycol 3350 17 Gram(s) Oral daily  senna 2 Tablet(s) Oral at bedtime    MEDICATIONS  (PRN):  acetaminophen     Tablet .. 650 milliGRAM(s) Oral every 6 hours PRN Temp greater or equal to 38.5C (101.3F), Moderate Pain (4 - 6)  ondansetron Injectable 4 milliGRAM(s) IV Push every 6 hours PRN Nausea and/or Vomiting    PAST MEDICAL & SURGICAL HISTORY:  HTN (hypertension)      History of lumbar fusion        FAMILY HISTORY:  Family history stroke in brother (Sibling)      SOCIAL HISTORY:  [place]: [ ] Current, [ ] Former, [ ] Never; Pack Years:  Alcohol:  Illicit Drugs:    REVIEW OF SYSTEMS:  Constitutional: No fever, chills, weight loss, or fatigue  ENMT:  No visual changes; No difficulty hearing, tinnitus, vertigo; No sinus or throat pain  Neck: No pain or stiffness  Respiratory: No cough, wheezing, or hemoptysis; No shortness of breath  Cardiovascular: No chest pain, palpitations, dizziness, orthopnea, PND, or leg swelling  Gastrointestinal: No abdominal or epigastric pain. No  nausea, vomiting, or hematemesis. No diarrhea, constipation, or steatorrhea. No melena or hematochezia  Genitourinary: No dysuria, urinary frequency/hesitancy, or hematuria  Skin: No itching, burning, rashes or lesions   Musculoskeletal: No joint pain or swelling; No muscle, back or extremity pain    Vital Signs Last 24 Hrs  T(C): 36.6 (22 Dec 2022 12:18), Max: 37.5 (22 Dec 2022 03:46)  T(F): 97.9 (22 Dec 2022 12:18), Max: 99.5 (22 Dec 2022 03:46)  HR: 86 (22 Dec 2022 16:23) (76 - 90)  BP: 142/90 (22 Dec 2022 16:23) (129/71 - 142/90)  BP(mean): --  RR: 18 (22 Dec 2022 16:23) (16 - 18)  SpO2: 98% (22 Dec 2022 16:23) (98% - 98%)    Parameters below as of 22 Dec 2022 16:23  Patient On (Oxygen Delivery Method): room air        12-21 @ 07:01  -  12-22 @ 07:00  --------------------------------------------------------  IN: 0 mL / OUT: 400 mL / NET: -400 mL        PHYSICAL EXAM:    General: Well developed; well nourished; in no acute distress  HEENT: MMM, conjunctiva and sclera clear  Gastrointestinal: Soft, non-tender non-distended; Normal bowel sounds; No rebound or guarding  Extremities: Normal range of motion, No clubbing, cyanosis or edema  Neurological: Alert and oriented x3  Skin: Warm and dry. No obvious rash    LABS:                        12.6   5.28  )-----------( 173      ( 22 Dec 2022 05:08 )             36.9     12-22    136  |  103  |  9   ----------------------------<  94  3.9   |  23  |  1.02    Ca    9.0      22 Dec 2022 05:08          RADIOLOGY & ADDITIONAL STUDIES:    HPI:  56yo M with PMHx of HTN, previous lumbar fusion (March 2022) who originally was BIBEMS to [facility] on 12/16/22 after an episode of extreme dizziness and gait instability that caused him to fall, unclear whether he hit his head or +LOC, but then describes episode of aphasia, contracture of bilateral hands and confusion. He reports that he lives in the basement of his apartment building and that his upstairs neighbor heard him fall and called an ambulance. Episode lasted approximately about 1-1.5 hours. On arrival to [place], CTH was negative, but MRI demonstrated multiple acute cerebellar ischemic infarcts. He was then started on aspirin, plavix and atorvastatin. 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 Patient was transferred to [facility] for further workup for possible complex bypass surgery. Of note, patient endorses that he had a brother who passed away from complications of multiple strokes but otherwise denies family history of bleeding/clotting disorders.     Denies previous episodes of gait instability or falls, visual changes, syncopal episodes.  (22 Dec 2022 04:32)    Since his admission, patient is undergoing workup in preparation for his cerebral bypass procedure, tentatively next week. 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 No adenopathy.    GI consulted for consideration of endoscopic evaluation of rectal mass.    Allergies    No Known Allergies    Intolerances      MEDICATIONS:  MEDICATIONS  (STANDING):  aspirin enteric coated 325 milliGRAM(s) Oral daily  atorvastatin 80 milliGRAM(s) Oral at bedtime  enoxaparin Injectable 40 milliGRAM(s) SubCutaneous <User Schedule>  polyethylene glycol 3350 17 Gram(s) Oral daily  senna 2 Tablet(s) Oral at bedtime    MEDICATIONS  (PRN):  acetaminophen     Tablet .. 650 milliGRAM(s) Oral every 6 hours PRN Temp greater or equal to 38.5C (101.3F), Moderate Pain (4 - 6)  ondansetron Injectable 4 milliGRAM(s) IV Push every 6 hours PRN Nausea and/or Vomiting    PAST MEDICAL & SURGICAL HISTORY:  HTN (hypertension)      History of lumbar fusion        FAMILY HISTORY:  Family history stroke in brother (Sibling)      SOCIAL HISTORY:  [place]ac[place]: [ ] Current, [ ] Former, [ ] Never; Pack Years:  Alcohol:  Illicit Drugs:    REVIEW OF SYSTEMS:  Constitutional: No fever, chills, weight loss, or fatigue  ENMT:  No visual changes; No difficulty hearing, tinnitus, vertigo; No sinus or throat pain  Neck: No pain or stiffness  Respiratory: No cough, wheezing, or hemoptysis; No shortness of breath  Cardiovascular: No chest pain, palpitations, dizziness, orthopnea, PND, or leg swelling  Gastrointestinal: No abdominal or epigastric pain. No  nausea, vomiting, or hematemesis. No diarrhea, constipation, or steatorrhea. No melena or hematochezia  Genitourinary: No dysuria, urinary frequency/hesitancy, or hematuria  Skin: No itching, burning, rashes or lesions   Musculoskeletal: No joint pain or swelling; No muscle, back or extremity pain    Vital Signs Last 24 Hrs  T(C): 36.6 (22 Dec 2022 12:18), Max: 37.5 (22 Dec 2022 03:46)  T(F): 97.9 (22 Dec 2022 12:18), Max: 99.5 (22 Dec 2022 03:46)  HR: 86 (22 Dec 2022 16:23) (76 - 90)  BP: 142/90 (22 Dec 2022 16:23) (129/71 - 142/90)  BP(mean): --  RR: 18 (22 Dec 2022 16:23) (16 - 18)  SpO2: 98% (22 Dec 2022 16:23) (98% - 98%)    Parameters below as of 22 Dec 2022 16:23  Patient On (Oxygen Delivery Method): room air        12-21 @ 07:01  -  12-22 @ 07:00  --------------------------------------------------------  IN: 0 mL / OUT: 400 mL / NET: -400 mL        PHYSICAL EXAM:    General: Well developed; well nourished  male; in no acute distress  HEENT: MMM, conjunctiva and sclera clear  Gastrointestinal: Soft, non-tender non-distended; Normal bowel sounds; No rebound or guarding  Extremities: Normal range of motion, No clubbing, cyanosis or edema  Neurological: Alert and oriented x3  Skin: Warm and dry. No obvious rash    LABS:                        12.6   5.28  )-----------( 173      ( 22 Dec 2022 05:08 )             36.9     12-22    136  |  103  |  9   ----------------------------<  94  3.9   |  23  |  1.02    Ca    9.0      22 Dec 2022 05:08          RADIOLOGY & ADDITIONAL STUDIES:    HPI:  58yo M with PMHx of HTN, previous lumbar fusion (March 2022) who originally was BIBEMS to [facility] on 12/16/22 after an episode of extreme dizziness and gait instability that caused him to fall, unclear whether he hit his head or +LOC, but then describes episode of aphasia, contracture of bilateral hands and confusion. He reports that he lives in the basement of his apartment building and that his upstairs neighbor heard him fall and called an ambulance. Episode lasted approximately about 1-1.5 hours. On arrival to [place], CTH was negative, but MRI demonstrated multiple acute cerebellar ischemic infarcts. He was then started on aspirin, plavix and atorvastatin. 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 Patient was transferred to [facility] for further workup for possible complex bypass surgery. Of note, patient endorses that he had a brother who passed away from complications of multiple strokes but otherwise denies family history of bleeding/clotting disorders.     Denies previous episodes of gait instability or falls, visual changes, syncopal episodes.  (22 Dec 2022 04:32)    Since his admission, patient is undergoing workup in preparation for his cerebral bypass procedure, tentatively next week. 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 No adenopathy.    GI consulted for consideration of endoscopic evaluation of rectal mass.    SH: social smoker, denies EtOH use, daily marijuana use  FMHx: no family history of colorectal or GI related malignancy    Allergies    No Known Allergies    Intolerances      MEDICATIONS:  MEDICATIONS  (STANDING):  aspirin enteric coated 325 milliGRAM(s) Oral daily  atorvastatin 80 milliGRAM(s) Oral at bedtime  enoxaparin Injectable 40 milliGRAM(s) SubCutaneous <User Schedule>  polyethylene glycol 3350 17 Gram(s) Oral daily  senna 2 Tablet(s) Oral at bedtime    MEDICATIONS  (PRN):  acetaminophen     Tablet .. 650 milliGRAM(s) Oral every 6 hours PRN Temp greater or equal to 38.5C (101.3F), Moderate Pain (4 - 6)  ondansetron Injectable 4 milliGRAM(s) IV Push every 6 hours PRN Nausea and/or Vomiting    PAST MEDICAL & SURGICAL HISTORY:  HTN (hypertension)      History of lumbar fusion        FAMILY HISTORY:  Family history stroke in brother (Sibling)      SOCIAL HISTORY:  [place]: [ ] Current, [ ] Former, [ ] Never; Pack Years:  Alcohol:  Illicit Drugs:    REVIEW OF SYSTEMS:  Constitutional: No fever, chills, weight loss, or fatigue  ENMT:  No visual changes; No difficulty hearing, tinnitus, vertigo; No sinus or throat pain  Neck: No pain or stiffness  Respiratory: No cough, wheezing, or hemoptysis; No shortness of breath  Cardiovascular: No chest pain, palpitations, dizziness, orthopnea, PND, or leg swelling  Gastrointestinal: No abdominal or epigastric pain. No  nausea, vomiting, or hematemesis. No diarrhea, constipation, or steatorrhea. No melena or hematochezia  Genitourinary: No dysuria, urinary frequency/hesitancy, or hematuria  Skin: No itching, burning, rashes or lesions   Musculoskeletal: No joint pain or swelling; No muscle, back or extremity pain    Vital Signs Last 24 Hrs  T(C): 36.6 (22 Dec 2022 12:18), Max: 37.5 (22 Dec 2022 03:46)  T(F): 97.9 (22 Dec 2022 12:18), Max: 99.5 (22 Dec 2022 03:46)  HR: 86 (22 Dec 2022 16:23) (76 - 90)  BP: 142/90 (22 Dec 2022 16:23) (129/71 - 142/90)  BP(mean): --  RR: 18 (22 Dec 2022 16:23) (16 - 18)  SpO2: 98% (22 Dec 2022 16:23) (98% - 98%)    Parameters below as of 22 Dec 2022 16:23  Patient On (Oxygen Delivery Method): room air        12-21 @ 07:01  -  12-22 @ 07:00  --------------------------------------------------------  IN: 0 mL / OUT: 400 mL / NET: -400 mL        PHYSICAL EXAM:    General: Well developed; well nourished  male; in no acute distress  HEENT: MMM, conjunctiva and sclera clear  Gastrointestinal: Soft, non-tender non-distended; Normal bowel sounds; No rebound or guarding  Extremities: Normal range of motion, No clubbing, cyanosis or edema  Neurological: Alert and oriented x3  Skin: Warm and dry. No obvious rash    LABS:                        12.6   5.28  )-----------( 173      ( 22 Dec 2022 05:08 )             36.9     12-22    136  |  103  |  9   ----------------------------<  94  3.9   |  23  |  1.02    Ca    9.0      22 Dec 2022 05:08          RADIOLOGY & ADDITIONAL STUDIES:

## 2022-12-22 NOTE — OCCUPATIONAL THERAPY INITIAL EVALUATION ADULT - MODIFIED CLINICAL TEST OF SENSORY INTEGRATION IN BALANCE TEST
Pt able to ambulate ~100' without AD independently, no LOB noted with pt demonstrating steady pace throughout. Pt also able to safely navigate up/down 3 steps without use of railing independently.

## 2022-12-22 NOTE — H&P ADULT - ASSESSMENT
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 Patient was transferred to [facility] for further workup for possible complex bypass surgery

## 2022-12-22 NOTE — OCCUPATIONAL THERAPY INITIAL EVALUATION ADULT - MANUAL MUSCLE TESTING RESULTS, REHAB EVAL
BUE/BLE ~5/5 throughout at all major pivot points, assessed while seated at EOB/no strength deficits were identified

## 2022-12-22 NOTE — SPEECH LANGUAGE PATHOLOGY EVALUATION - SLP DIAGNOSIS
Patient presents with intact speech and language function. No functional deficits identified per eval or pt report.

## 2022-12-22 NOTE — CONSULT NOTE ADULT - ASSESSMENT
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 Patient was transferred to [facility] for further workup for possible complex bypass surgery.  Hospital course complicated by finding of new rectal mass

## 2022-12-22 NOTE — PHYSICAL THERAPY INITIAL EVALUATION ADULT - MODALITIES TREATMENT COMMENTS
CN Testing: B/L Frontalis intact; B/L buccinator intact; smile symmetrical; tongue protrusion at midline; B/L eyes open/close intact; Shoulder elevation: intact bilaterally; Vision H-Test: bilateral tracking and smooth pursuit intact; Convergence/Divergence: intact;

## 2022-12-22 NOTE — PHYSICAL THERAPY INITIAL EVALUATION ADULT - LEVEL OF CONSCIOUSNESS, REHAB EVAL
[address], [place] [address]                                 EVENT MONITOR    PATIENT NAME: Darlyn Moya                    :        1956  MED REC NO:   [id number]                              ROOM:  ACCOUNT NO:   [de-identified]                           ADMIT DATE: 2020  PROVIDER:     Yakov Gaspar. [facility]    CARDIOVASCULAR DIAGNOSTIC DEPARTMENT    DATE OF STUDY:  2020    ORDERING PROVIDER:  Yakov Gaspar. [facility], MD    INTERPRETING PHYSICIAN: Keegan Coffey [facility], MD    DIAGNOSIS:  Essential (primary) hypertension. PHYSICIAN INTERPRETATION:  1.  3 days and 1 hour recorded. 2.  Baseline rhythm is sinus with average heart rate of 71 bpm ranging  between 42 and 132 bpm.  3.  Sinus tachycardia represented 3% of the study distribution. 4.  Occasional APCs, 3% with multiple runs of what appeared to be  ectopic atrial tachycardia. The longest lasted for 2.5 hours at a heart  rate of 174 bpm.  These episodes usually terminated into sinus pauses  followed by junctional escape beats and sinus bradycardia. 5.  A total of 12 pauses more than 2.5 seconds, the longest being 3.0  seconds. Some of these were conversion pauses and happening during  typical daytime hours. 6.  Occasional PVCs (3%) with 2 runs of wide QRS tachycardia. The  longest was 5 beats at a heart rate of 134 bpm.  Cannot exclude  supraventricular tachycardia with aberrant conduction. TOVA CEE    D: 2020 11:54:24       T: 2020 11:55:58     [email or web address]  Job#: [id number]     Doc#: Unknown    CC:
alert

## 2022-12-22 NOTE — CONSULT NOTE ADULT - PROBLEM SELECTOR RECOMMENDATION 9
Possible bypass per NSG  - dopplers w/o DVT  - c/w , holding plavix for possible procedures  - c/w statin  - LE CT angio incidentally found rectal mass, see plan as below

## 2022-12-22 NOTE — CONSULT NOTE ADULT - ASSESSMENT
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 On examination, pt reports feeling well and was in his normal state of health prior to this recent neurologic event. Denies recent abdominal pain, nausea, or vomiting. Denies recent weight loss, blood in his stool, or changes in stool caliber. States that he is a current smoker (cigarettes and marijuana), but denies alcohol intake. Denies family history of IBS, Crohns, UC, or colon cancer. Of note, pt has never had a colonoscopy.    Recommendations:  Complete staging workup for possible colorectal malignancy  GI already consulted - will follow up findings from c-scope (planned for 12/23)  Additional recommendations pending the above    Plan discussed with chief resident and Dr. Barfield

## 2022-12-22 NOTE — CONSULT NOTE ADULT - ASSESSMENT
56yo M with PMHx of HTN, previous lumbar fusion (March 2022) who originally was BIBEMS to [facility] on 12/16/22 after an episode of extreme dizziness and gait instability that caused him to fall, admitted to [facility] for further workup and found to have b/l R>L acute/subacute cerebellar infarcts. 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 Patient is asymptomatic and had palpable distal pulses bilaterally.    Plan:  - No acute vascular intervention at this time    INCOMPLETE*** 58yo M with PMHx of HTN, previous lumbar fusion (March 2022) who originally was BIBEMS to [facility] on 12/16/22 after an episode of extreme dizziness and gait instability that caused him to fall, admitted to [facility] for further workup and found to have b/l R>L acute/subacute cerebellar infarcts. 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 Patient is asymptomatic and has palpable distal pulses bilaterally.    Plan:  - No acute vascular intervention at this time  - Vascular to sign off  - Plan discussed with chief resident

## 2022-12-22 NOTE — OCCUPATIONAL THERAPY INITIAL EVALUATION ADULT - DIAGNOSIS, OT EVAL
MRS 0. Pt admitted for stroke workup, MRI found to have multiple acute cerebellar infarcts, currently presents at functional baseline, no neurological deficits noted, BUE/BLE strength and sensation WFL and vision intact with pt able to perform all ADLs/IADLs independently without AD or DME. No further acute OT needs. Pt will be d/c from program at this time.

## 2022-12-22 NOTE — CONSULT NOTE ADULT - SUBJECTIVE AND OBJECTIVE BOX
Patient is a 57y old  Male who presents with a chief complaint of stroke workup, possible bypass candidate (22 Dec 2022 14:02)      HPI:  58yo M with PMHx of HTN, previous lumbar fusion (March 2022) who originally was BIBEMS to [facility] on 12/16/22 after an episode of extreme dizziness and gait instability that caused him to fall, unclear whether he hit his head or +LOC, but then describes episode of aphasia, contracture of bilateral hands and confusion. He reports that he lives in the basement of his apartment building and that his upstairs neighbor heard him fall and called an ambulance. Episode lasted approximately about 1-1.5 hours. On arrival to [place], CTH was negative, but MRI demonstrated multiple acute cerebellar ischemic infarcts. He was then started on aspirin, plavix and atorvastatin. 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 Patient was transferred to [facility] for further workup for possible complex bypass surgery. Of note, patient endorses that he had a brother who passed away from complications of multiple strokes but otherwise denies family history of bleeding/clotting disorders.     Denies previous episodes of gait instability or falls, visual changes, syncopal episodes.  (22 Dec 2022 04:32)    Patient seen and examined at bedside.  Reports feeling well today.  He first noted gait instability on Friday and prior to that had been in his normal state of health.  Prior to gait instability and fall was able to ambulate several city blocks or up several flights of stairs, activity was only limited by back pain for which he had lumbar spinal fusion in [place] in March.  He has a PCP and had previously only been diagnosed with HTN for which he takes amlodipine.  He has never had a colonoscopy.  Denies weight loss, blood in stool, abdominal pain, change in bowel habits.  Denies fevers, chills, CP, SOB, N/V.      REVIEW OF SYSTEMS: see HPI    PAST MEDICAL & SURGICAL HISTORY:  HTN (hypertension)      History of lumbar fusion          FAMILY HISTORY:  Hx of HTN in mother    SOCIAL HISTORY:  Tobacco use: None  EtOH use: None  Recreational drug use: recreational cannabis use 1x per week    MEDICATIONS:  MEDICATIONS  (STANDING):  aspirin enteric coated 325 milliGRAM(s) Oral daily  atorvastatin 80 milliGRAM(s) Oral at bedtime  enoxaparin Injectable 40 milliGRAM(s) SubCutaneous <User Schedule>  polyethylene glycol 3350 17 Gram(s) Oral daily  senna 2 Tablet(s) Oral at bedtime    MEDICATIONS  (PRN):  acetaminophen     Tablet .. 650 milliGRAM(s) Oral every 6 hours PRN Temp greater or equal to 38.5C (101.3F), Moderate Pain (4 - 6)  ondansetron Injectable 4 milliGRAM(s) IV Push every 6 hours PRN Nausea and/or Vomiting      ALLERGIES:  Allergies    No Known Allergies    Intolerances        VITAL SIGNS:  Vital Signs Last 24 Hrs  T(C): 36.6 (22 Dec 2022 12:18), Max: 37.5 (22 Dec 2022 03:46)  T(F): 97.9 (22 Dec 2022 12:18), Max: 99.5 (22 Dec 2022 03:46)  HR: 80 (22 Dec 2022 12:18) (76 - 90)  BP: 129/71 (22 Dec 2022 12:18) (129/71 - 133/89)  BP(mean): --  RR: 18 (22 Dec 2022 12:18) (16 - 18)  SpO2: 98% (22 Dec 2022 12:18) (98% - 98%)    Parameters below as of 22 Dec 2022 12:18  Patient On (Oxygen Delivery Method): room air        12-21-22 @ 07:01  -  12-22-22 @ 07:00  --------------------------------------------------------  IN:  Total IN: 0 mL    OUT:    Voided (mL): 400 mL  Total OUT: 400 mL    Total NET: -400 mL          PHYSICAL EXAM:  Constitutional: resting comfortably in bed; NAD  Head: NC/AT  Eyes: PERRL, EOMI, anicteric sclera  ENT: no nasal discharge; uvula midline, no oropharyngeal erythema or exudates; MMM  Neck: supple; no JVD  Respiratory: CTA B/L; no W/R/R, no retractions  Cardiac: +S1/S2; RRR; no M/R/G  Gastrointestinal: abdomen soft, NT/ND; no rebound or guarding; +BSx4  Genitourinary: normal external genitalia  Back: spine midline  Extremities: WWP, no clubbing or cyanosis; no peripheral edema  Musculoskeletal: NROM x4; no joint swelling, tenderness or erythema  Vascular: 2+ radial, femoral, DP/PT pulses B/L  Dermatologic: skin warm, dry and intact; no rashes, wounds, or scars  Neurologic: AAOx3; CNII-XII grossly intact; no focal deficits  Psychiatric: affect and characteristics of appearance, verbalizations, behaviors are appropriate    LABS:                        12.6   5.28  )-----------( 173      ( 22 Dec 2022 05:08 )             36.9     12-22    136  |  103  |  9   ----------------------------<  94  3.9   |  23  |  1.02    Ca    9.0      22 Dec 2022 05:08      PT/INR - ( 22 Dec 2022 05:08 )   PT: 15.1 sec;   INR: 1.27          PTT - ( 22 Dec 2022 05:08 )  PTT:26.3 sec        CAPILLARY BLOOD GLUCOSE              RADIOLOGY & ADDITIONAL TESTS: Reviewed.  < from: CT Angio Lower Extremity w/ IV Cont, Bilateral (12.22.22 @ 11:05) >  IMPRESSION:  1. 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 Distal reconstitution of flow in diminutive left   dorsalis pedis.  2. No arterial occlusion in the right lower extremity, three-vessel   runoff down to the foot. Multifocal right anterior tibialis artery   stenosis.  3. 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 Consider   GI consultationand pelvic MRI.    < end of copied text >    < from: US Duplex Venous Lower Ext Complete, Bilateral (12.22.22 @ 11:31) >  IMPRESSION:  No evidence of deep venous thrombosis in either lower extremity.    < end of copied text >    < from: CT Head No Cont (12.22.22 @ 11:00) >  Impression:    Right greater than left acute/subacute cerebellar hemisphere infarctions   correspondingto reported outside MRI as documented in medical record. No   significant mass effect or midline shift. No acute intracranial   hemorrhage..      < end of copied text >

## 2022-12-22 NOTE — OCCUPATIONAL THERAPY INITIAL EVALUATION ADULT - GENERAL OBSERVATIONS, REHAB EVAL
Pt received semi-supine in bed, +tele, +heplock, in NAD and agreeable to OT with encouragement. Cleared by nsx CHANDA Wheatley and ARIELLA Cerda to see.

## 2022-12-22 NOTE — CONSULT NOTE ADULT - ASSESSMENT
56yo M with PMHx of HTN, previous lumbar fusion (March 2022) who originally was BIBEMS to [facility] on 12/16/22 after an episode of extreme dizziness and gait instability that caused him to fall, admitted to [facility] for further workup and found to have b/l R>L acute/subacute cerebellar infarcts. Vascular consulted for evaluation of anterior tibial artery for use as a graft for a cerebral bypass. CTA showed occlusion of proximal left anterior tibialis artery with two-vessel runoff down to the foot and stenosis of the right anterior tibialis. Patient is asymptomatic and has palpable distal pulses bilaterally.    Plan:  - No acute vascular intervention at this time  INCOMPLETE ***   58yo M with PMHx of HTN, previous lumbar fusion (March 2022) who originally was BIBEMS to [facility] on 12/16/22 after an episode of extreme dizziness and gait instability that caused him to fall, admitted to [facility] for further workup and found to have b/l R>L acute/subacute cerebellar infarcts. Vascular consulted for evaluation of anterior tibial artery for use as a graft for a cerebral bypass. CTA showed occlusion of proximal left anterior tibialis artery with two-vessel runoff down to the foot and stenosis of the right anterior tibialis. Patient is asymptomatic and has palpable distal pulses bilaterally.    Plan:  - No acute vascular intervention at this time  - Pending final recs after discussion with Dr. Lynn  - Vascular surgery Team 3C will continue to follow. Please call [phone] with any questions or concerns.       58yo M with PMHx of HTN, previous lumbar fusion (March 2022) who originally was BIBEMS to [facility] on 12/16/22 after an episode of extreme dizziness and gait instability that caused him to fall, admitted to [facility] for further workup and found to have b/l R>L acute/subacute cerebellar infarcts. Vascular consulted for evaluation of anterior tibial artery for use as a graft for a cerebral bypass. CTA showed occlusion of proximal left anterior tibialis artery with two-vessel runoff down to the foot and stenosis of the right anterior tibialis. Patient is asymptomatic and has palpable distal pulses bilaterally.    Plan:  - No acute vascular intervention at this time  - Pending final recs after discussion with Dr. Lynn  - Vascular surgery Team 3C will continue to follow. Please call [phone] with any questions or concerns.      Addendum: Case d/w Dr. Lynn, recommend arterial duplex of bilateral LE to better evaluate arterial flow.

## 2022-12-22 NOTE — H&P ADULT - NSHPPHYSICALEXAM_GEN_ALL_CORE
General: NAD, pt is comfortably sitting up in bed, on room air  HEENT: CN II-XII grossly intact, PERRL 3mm briskly reactive, EOMI b/l, face symmetric, tongue midline, neck FROM. +Visual fields intact. +facial sensation intact in V1/V2/V3 distributions.   Cardiovascular: RRR, normal S1 and S2   Respiratory: lungs CTAB, no wheezing, rhonchi, or crackles   GI: normoactive BS to auscultation, abd soft, NTND   Neuro: A&Ox3, No aphasia, speech clear, +trace RUE dysmetria, no pronator drift. Follows commands.  TAN x4 spontaneously, 5/5 strength in all extremities throughout. Normal heel to shin bilaterally, normal rapid alternating movements. SILT throughout.    Extremities: warm and well perfused.

## 2022-12-22 NOTE — OCCUPATIONAL THERAPY INITIAL EVALUATION ADULT - ADDITIONAL COMMENTS
Pt reports that he lives in the basement of an apartment ~6 steps to get to basement. Pt states that he shares the basement with another person and then a person lives upstairs. Pt reports being independent with all ADLs/IADLs and mobility, did not require any DME or ADs. Denies history of falls. Pt is R hand dominant and wears glasses at baseline

## 2022-12-22 NOTE — CONSULT NOTE ADULT - PROBLEM SELECTOR RECOMMENDATION 4
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

## 2022-12-23 ENCOUNTER — RESULT REVIEW (OUTPATIENT)
Age: 57
End: 2022-12-23

## 2022-12-23 LAB
ANION GAP SERPL CALC-SCNC: 11 MMOL/L — SIGNIFICANT CHANGE UP (ref 5–17)
APTT BLD: 31.5 SEC — SIGNIFICANT CHANGE UP (ref 27.5–35.5)
APTT BLD: 31.8 SEC — SIGNIFICANT CHANGE UP (ref 27.5–35.5)
APTT BLD: 57 SEC — HIGH (ref 27.5–35.5)
BUN SERPL-MCNC: 6 MG/DL — LOW (ref 7–23)
CALCIUM SERPL-MCNC: 8.8 MG/DL — SIGNIFICANT CHANGE UP (ref 8.4–10.5)
CANCER AG19-9 SERPL-ACNC: <2 U/ML — SIGNIFICANT CHANGE UP
CEA SERPL-MCNC: 3.2 NG/ML — SIGNIFICANT CHANGE UP (ref 0–3.8)
CHLORIDE SERPL-SCNC: 102 MMOL/L — SIGNIFICANT CHANGE UP (ref 96–108)
CO2 SERPL-SCNC: 24 MMOL/L — SIGNIFICANT CHANGE UP (ref 22–31)
CREAT SERPL-MCNC: 0.81 MG/DL — SIGNIFICANT CHANGE UP (ref 0.5–1.3)
EGFR: 103 ML/MIN/1.73M2 — SIGNIFICANT CHANGE UP
GLUCOSE SERPL-MCNC: 105 MG/DL — HIGH (ref 70–99)
HCT VFR BLD CALC: 37.9 % — LOW (ref 39–50)
HCT VFR BLD CALC: 39.3 % — SIGNIFICANT CHANGE UP (ref 39–50)
HCYS SERPL-MCNC: 13.8 UMOL/L — SIGNIFICANT CHANGE UP
HGB BLD-MCNC: 13.2 G/DL — SIGNIFICANT CHANGE UP (ref 13–17)
HGB BLD-MCNC: 13.5 G/DL — SIGNIFICANT CHANGE UP (ref 13–17)
INR BLD: 1.26 — HIGH (ref 0.88–1.16)
INR BLD: 1.32 — HIGH (ref 0.88–1.16)
MAGNESIUM SERPL-MCNC: 2.1 MG/DL — SIGNIFICANT CHANGE UP (ref 1.6–2.6)
MCHC RBC-ENTMCNC: 29.1 PG — SIGNIFICANT CHANGE UP (ref 27–34)
MCHC RBC-ENTMCNC: 29.5 PG — SIGNIFICANT CHANGE UP (ref 27–34)
MCHC RBC-ENTMCNC: 34.4 GM/DL — SIGNIFICANT CHANGE UP (ref 32–36)
MCHC RBC-ENTMCNC: 34.8 GM/DL — SIGNIFICANT CHANGE UP (ref 32–36)
MCV RBC AUTO: 84.6 FL — SIGNIFICANT CHANGE UP (ref 80–100)
MCV RBC AUTO: 84.7 FL — SIGNIFICANT CHANGE UP (ref 80–100)
NRBC # BLD: 0 /100 WBCS — SIGNIFICANT CHANGE UP (ref 0–0)
NRBC # BLD: 0 /100 WBCS — SIGNIFICANT CHANGE UP (ref 0–0)
PHOSPHATE SERPL-MCNC: 2.7 MG/DL — SIGNIFICANT CHANGE UP (ref 2.5–4.5)
PLATELET # BLD AUTO: 177 K/UL — SIGNIFICANT CHANGE UP (ref 150–400)
PLATELET # BLD AUTO: 194 K/UL — SIGNIFICANT CHANGE UP (ref 150–400)
POTASSIUM SERPL-MCNC: 3.6 MMOL/L — SIGNIFICANT CHANGE UP (ref 3.5–5.3)
POTASSIUM SERPL-SCNC: 3.6 MMOL/L — SIGNIFICANT CHANGE UP (ref 3.5–5.3)
PROTHROM AB SERPL-ACNC: 15 SEC — HIGH (ref 10.5–13.4)
PROTHROM AB SERPL-ACNC: 15.7 SEC — HIGH (ref 10.5–13.4)
RBC # BLD: 4.48 M/UL — SIGNIFICANT CHANGE UP (ref 4.2–5.8)
RBC # BLD: 4.64 M/UL — SIGNIFICANT CHANGE UP (ref 4.2–5.8)
RBC # FLD: 14 % — SIGNIFICANT CHANGE UP (ref 10.3–14.5)
RBC # FLD: 14 % — SIGNIFICANT CHANGE UP (ref 10.3–14.5)
SODIUM SERPL-SCNC: 137 MMOL/L — SIGNIFICANT CHANGE UP (ref 135–145)
WBC # BLD: 4.77 K/UL — SIGNIFICANT CHANGE UP (ref 3.8–10.5)
WBC # BLD: 5.21 K/UL — SIGNIFICANT CHANGE UP (ref 3.8–10.5)
WBC # FLD AUTO: 4.77 K/UL — SIGNIFICANT CHANGE UP (ref 3.8–10.5)
WBC # FLD AUTO: 5.21 K/UL — SIGNIFICANT CHANGE UP (ref 3.8–10.5)

## 2022-12-23 PROCEDURE — 70544 MR ANGIOGRAPHY HEAD W/O DYE: CPT | Mod: 26,59

## 2022-12-23 PROCEDURE — 88342 IMHCHEM/IMCYTCHM 1ST ANTB: CPT | Mod: 26

## 2022-12-23 PROCEDURE — 88173 CYTOPATH EVAL FNA REPORT: CPT | Mod: 26

## 2022-12-23 PROCEDURE — 99233 SBSQ HOSP IP/OBS HIGH 50: CPT

## 2022-12-23 PROCEDURE — 99252 IP/OBS CONSLTJ NEW/EST SF 35: CPT

## 2022-12-23 PROCEDURE — 99232 SBSQ HOSP IP/OBS MODERATE 35: CPT

## 2022-12-23 PROCEDURE — 99221 1ST HOSP IP/OBS SF/LOW 40: CPT

## 2022-12-23 PROCEDURE — 45342 SIGMOIDOSCOPY W/US GUIDE BX: CPT

## 2022-12-23 PROCEDURE — 88305 TISSUE EXAM BY PATHOLOGIST: CPT | Mod: 26

## 2022-12-23 PROCEDURE — 88341 IMHCHEM/IMCYTCHM EA ADD ANTB: CPT | Mod: 26

## 2022-12-23 PROCEDURE — 70553 MRI BRAIN STEM W/O & W/DYE: CPT | Mod: 26

## 2022-12-23 RX ORDER — HEPARIN SODIUM 5000 [USP'U]/ML
1200 INJECTION INTRAVENOUS; SUBCUTANEOUS
Qty: 25000 | Refills: 0 | Status: DISCONTINUED | OUTPATIENT
Start: 2022-12-24 | End: 2022-12-24

## 2022-12-23 RX ORDER — SODIUM,POTASSIUM PHOSPHATES 278-250MG
1 POWDER IN PACKET (EA) ORAL ONCE
Refills: 0 | Status: COMPLETED | OUTPATIENT
Start: 2022-12-23 | End: 2022-12-23

## 2022-12-23 RX ORDER — ASPIRIN/CALCIUM CARB/MAGNESIUM 324 MG
81 TABLET ORAL DAILY
Refills: 0 | Status: DISCONTINUED | OUTPATIENT
Start: 2022-12-23 | End: 2022-12-23

## 2022-12-23 RX ORDER — POTASSIUM CHLORIDE 20 MEQ
40 PACKET (EA) ORAL ONCE
Refills: 0 | Status: COMPLETED | OUTPATIENT
Start: 2022-12-23 | End: 2022-12-23

## 2022-12-23 RX ADMIN — Medication 40 MILLIEQUIVALENT(S): at 07:59

## 2022-12-23 RX ADMIN — Medication 1 ENEMA: at 10:46

## 2022-12-23 RX ADMIN — Medication 650 MILLIGRAM(S): at 06:51

## 2022-12-23 RX ADMIN — Medication 81 MILLIGRAM(S): at 11:55

## 2022-12-23 RX ADMIN — Medication 650 MILLIGRAM(S): at 22:08

## 2022-12-23 RX ADMIN — Medication 650 MILLIGRAM(S): at 22:50

## 2022-12-23 RX ADMIN — Medication 1 PACKET(S): at 07:59

## 2022-12-23 RX ADMIN — ATORVASTATIN CALCIUM 80 MILLIGRAM(S): 80 TABLET, FILM COATED ORAL at 22:07

## 2022-12-23 RX ADMIN — Medication 650 MILLIGRAM(S): at 07:24

## 2022-12-23 NOTE — PROGRESS NOTE ADULT - SUBJECTIVE AND OBJECTIVE BOX
HPI:  58yo M with PMHx of HTN, previous lumbar fusion (March 2022) who originally was BIBEMS to [facility] on 12/16/22 after an episode of extreme dizziness and gait instability that caused him to fall, unclear whether he hit his head or +LOC, but then describes episode of aphasia, contracture of bilateral hands and confusion. He reports that he lives in the basement of his apartment building and that his upstairs neighbor heard him fall and called an ambulance. Episode lasted approximately about 1-1.5 hours. On arrival to [facility], CTH was negative, but MRI demonstrated multiple acute cerebellar ischemic infarcts. He was then started on aspirin, plavix and atorvastatin. 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 Patient was transferred to [facility] for further workup for possible complex bypass surgery. Of note, patient endorses that he had a brother who passed away from complications of multiple strokes but otherwise denies family history of bleeding/clotting disorders.     Denies previous episodes of gait instability or falls, visual changes, syncopal episodes.  (22 Dec 2022 04:32)    OVERNIGHT EVENTS: EDU, on heparin drip til 2AM and prepped for colonoscopy today.    HOSPITAL COURSE:  12/22: Admitted. Plavix dc'd needs to be off for 1 week preop. echo performed, WNL. dopplers negative. CTH shows b/l R>L acute/subacute cerebellar infarcts. 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 vascular consulted, rec b/l arterial dopplers to eval tibial arteries. GI consulted, preop colonoscopy tomorrow. started on heparin gtt for stroke  12/23: EDU overnight, heparin gtt to be held at 2AM. pending colonoscopy today      Vital Signs Last 24 Hrs  T(C): 37.2 (22 Dec 2022 21:00), Max: 37.5 (22 Dec 2022 03:46)  T(F): 99 (22 Dec 2022 21:00), Max: 99.5 (22 Dec 2022 03:46)  HR: 84 (22 Dec 2022 20:19) (76 - 90)  BP: 129/82 (22 Dec 2022 20:19) (129/71 - 142/90)  BP(mean): 99 (22 Dec 2022 20:19) (99 - 99)  RR: 18 (22 Dec 2022 20:19) (16 - 18)  SpO2: 99% (22 Dec 2022 20:19) (98% - 99%)    Parameters below as of 22 Dec 2022 20:19  Patient On (Oxygen Delivery Method): room air        I&O's Summary    21 Dec 2022 07:01  -  22 Dec 2022 07:00  --------------------------------------------------------  IN: 0 mL / OUT: 400 mL / NET: -400 mL    22 Dec 2022 07:01  -  23 Dec 2022 00:00  --------------------------------------------------------  IN: 12 mL / OUT: 1200 mL / NET: -1188 mL        PHYSICAL EXAM:  General: NAD, pt is comfortably sitting up in bed, on room air  HEENT: CN II-XII grossly intact, PERRL 3mm briskly reactive, EOMI b/l, face symmetric, tongue midline, neck FROM. +Visual fields intact. +facial sensation intact in V1/V2/V3 distributions.   Cardiovascular: RRR, normal S1 and S2   Respiratory: lungs CTAB, no wheezing, rhonchi, or crackles   GI: normoactive BS to auscultation, abd soft, NTND   Neuro: A&Ox3, No aphasia, speech clear, +trace RUE dysmetria, no pronator drift. Follows commands.  TAN x4 spontaneously, 5/5 strength in all extremities throughout. Normal heel to shin bilaterally, normal rapid alternating movements. SILT throughout.    Extremities: warm and well perfused.    TUBES/LINES:  [] Sarah  [] Lumbar Drain  [] Wound Drains  [] Others      DIET:  [X] NPO  [] Mechanical  [] Tube feeds    LABS:                        12.6   5.28  )-----------( 173      ( 22 Dec 2022 05:08 )             36.9     12-22    136  |  103  |  9   ----------------------------<  94  3.9   |  23  |  1.02    Ca    9.0      22 Dec 2022 05:08      PT/INR - ( 22 Dec 2022 05:08 )   PT: 15.1 sec;   INR: 1.27          PTT - ( 22 Dec 2022 05:08 )  PTT:26.3 sec        CAPILLARY BLOOD GLUCOSE          Drug Levels: [] N/A    CSF Analysis: [] N/A      Allergies    No Known Allergies    Intolerances      MEDICATIONS:  Antibiotics:    Neuro:  acetaminophen     Tablet .. 650 milliGRAM(s) Oral every 6 hours PRN  ondansetron Injectable 4 milliGRAM(s) IV Push every 6 hours PRN    Anticoagulation:  aspirin enteric coated 81 milliGRAM(s) Oral daily  heparin  Infusion. 1200 Unit(s)/Hr IV Continuous <Continuous>    OTHER:  atorvastatin 80 milliGRAM(s) Oral at bedtime  polyethylene glycol 3350 17 Gram(s) Oral daily  senna 2 Tablet(s) Oral at bedtime    IVF:    CULTURES:    RADIOLOGY & ADDITIONAL TESTS:  < from: CT Head No Cont (12.22.22 @ 11:00) >    Impression:    Right greater than left acute/subacute cerebellar hemisphere infarctions   correspondingto reported outside MRI as documented in medical record. No   significant mass effect or midline shift. No acute intracranial   hemorrhage..    < end of copied text >  < from: CT Angio Lower Extremity w/ IV Cont, Bilateral (12.22.22 @ 11:05) >  IMPRESSION:  1. 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 Distal reconstitution of flow in diminutive left   dorsalis pedis.  2. No arterial occlusion in the right lower extremity, three-vessel   runoff down to the foot. Multifocal right anterior tibialis artery   stenosis.  3. 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 Consider   GI consultationand pelvic MRI.    < end of copied text >  < from: Echo W Bubble w/o Doppler Complete (12.22.22 @ 09:01) >  CONCLUSIONS:     1. Normal left and right ventricular size and systolic function.   2. Mild symmetric left ventricular hypertrophy.   3. No significant valvular disease.   4. No evidence of pulmonary hypertension.   5. No pericardial effusion.   6. Injection of agitated saline via a peripheral vein reveals no   evidence of a right-to-left shunt.    < end of copied text >  < from: US Duplex Venous Lower Ext Complete, Bilateral (12.22.22 @ 11:31) >  IMPRESSION:  No evidence of deep venous thrombosis in either lower extremity.    < end of copied text >      ASSESSMENT:  58yo M with PMHx of HTN, previous lumbar fusion (March 2022) who originally was BIBEMS to 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 Patient was transferred to [facility] for further workup for possible complex bypass surgery.     STROKE    No pertinent family history in first degree relatives    Family history stroke in brother (Sibling)    Handoff    MEWS Score    No pertinent past medical history    HTN (hypertension)    Vertebral artery occlusion, right    Cerebellar infarct    HTN (hypertension)    Rectal mass    Prophylactic measure    No significant past surgical history    History of lumbar fusion    [id number]      PLAN:  NEURO  - Neuro/vitals q4 hours  - ASA 325mg d/c'ed started on aspirin 81mg   - Hold plavix 7 days in setting of possible surgery.   - MR Richards pending  - stroke core measures   - CTH 12/22: R>L acute/subacute cerebellar infarcts    CARDIO  - -170  - hold home amlodipine  - echo with bubble 12/22: WNL, EF 64%    PULM  - Satting well on room air     GI   - NPO after midnight   - CTA demonstrating possible GIST tumor, GI consulted   - Colorectal surgery consulted   - Go lytely for colonoscopy 12/23   - bowel regimen     ENDO  - No active issues  - a1c 5.9    RENAL   - voiding     HEME/ONC  - SCDs, heparin gtt - hold at 2AM   - LE dopplers 12/22: negative  - CTA b/l LE 12/22: L anterior tibilalis occlusion, R anterior tibialis stenosis, possible GIST tumor  - CTCAP pending   - vascular consulted, f/u recs    ID  - Afebrile    Dispo: PT/OT pending, dispo pending OR  Assessment and plan discussed with Dr. Jarvis     Assessment:  Present when checked    []  GCS  E   V  M     Heart Failure: []Acute, [] acute on chronic , []chronic  Heart Failure:  [] Diastolic (HFpEF), [] Systolic (HFrEF), []Combined (HFpEF and HFrEF), [] RHF, [] Pulm HTN, [] Other    [] CARRILLO, [] ATN, [] AIN, [] other  [] CKD1, [] CKD2, [] CKD 3, [] CKD 4, [] CKD 5, []ESRD    Encephalopathy: [] Metabolic, [] Hepatic, [] toxic, [] Neurological, [] Other    Abnormal Nurtitional Status: [] malnurtition (see nutrition note), [ ]underweight: BMI < 19, [] morbid obesity: BMI >40, [] Cachexia    [] Sepsis  [] hypovolemic shock,[] cardiogenic shock, [] hemorrhagic shock, [] neuogenic shock  [] Acute Respiratory Failure  []Cerebral edema, [] Brain compression/ herniation,   [] Functional quadriplegia  [] Acute blood loss anemia

## 2022-12-23 NOTE — PROGRESS NOTE ADULT - ASSESSMENT
56yo M with PMHx of HTN, previous lumbar fusion (March 2022) who originally was BIBEMS to [facility] on 12/16/22 after an episode of extreme dizziness and gait instability that caused him to fall, admitted to [facility] for further workup and found to have b/l R>L acute/subacute cerebellar infarcts. Vascular consulted for evaluation of anterior tibial artery for use as a graft for a cerebral bypass. CTA showed occlusion of proximal left anterior tibialis artery with two-vessel runoff down to the foot and stenosis of the right anterior tibialis. Patient is asymptomatic and has palpable distal pulses bilaterally.    Plan:  - No acute vascular intervention at this time  - Will discuss operative planning between Dr. Lynn and Dr. Jarvis  - Vascular surgery Team 3C will continue to follow. Please call [phone] with any questions or concerns.

## 2022-12-23 NOTE — CONSULT NOTE ADULT - SUBJECTIVE AND OBJECTIVE BOX
**STROKE CODE CONSULT NOTE**    Last known well time/Time of onset of symptoms:    HPI: 57y Male with PMHx of HTN, previous lumbar fusion (March 2022) who originally was BIBEMS to [facility] on 12/16/22 after an episode of extreme dizziness and gait instability that caused him to fall, unclear whether he hit his head or +LOC, but then describes episode of aphasia, contracture of bilateral hands and confusion. He reports that he lives in the basement of his apartment building and that his upstairs neighbor heard him fall and called an ambulance. Episode lasted approximately about 1-1.5 hours. On arrival to [place], CTH was negative, but MRI demonstrated multiple acute cerebellar ischemic infarcts. He was then started on aspirin, plavix and atorvastatin. 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 Patient was transferred to [facility] for further workup for possible complex bypass surgery. Of note, patient endorses that he had a brother who passed away from complications of multiple strokes but otherwise denies family history of bleeding/clotting disorders.     T(C): 37.1 (12-23-22 @ 05:14), Max: 37.3 (12-22-22 @ 08:38)  HR: 78 (12-23-22 @ 04:18) (76 - 88)  BP: 124/80 (12-23-22 @ 04:18) (124/80 - 142/90)  RR: 17 (12-23-22 @ 04:18) (17 - 18)  SpO2: 99% (12-23-22 @ 04:18) (98% - 99%)    PAST MEDICAL & SURGICAL HISTORY:  HTN (hypertension)      History of lumbar fusion          FAMILY HISTORY:  Family history stroke in brother (Sibling)        SOCIAL HISTORY:   Patient lives with *** at ***.   Smoking status:  Drinking:  Drug Use:     ROS: ***  Constitutional: No fever, weight loss or fatigue  Eyes: No eye pain, visual disturbances, or discharge  ENMT:  No difficulty hearing, tinnitus; No sinus or throat pain  Neck: No pain or stiffness  Respiratory: No cough, wheezing, chills or hemoptysis  Cardiovascular: No chest pain, palpitations, shortness of breath, or leg swelling  Gastrointestinal: No abdominal pain. No nausea, vomiting or hematemesis; No diarrhea or constipation. Nohematochezia.  Genitourinary: No dysuria, frequency, hematuria or incontinence  Neurological: As per HPI  Skin: No itching, burning, rashes or lesions   Endocrine: No heat or cold intolerance; No hair loss  Musculoskeletal: No joint pain or swelling; No muscle, back or extremity pain  Heme/Lymph: No easy bruising or bleeding gums    MEDICATIONS  (STANDING):  aspirin enteric coated 81 milliGRAM(s) Oral daily  atorvastatin 80 milliGRAM(s) Oral at bedtime  heparin  Infusion. 1200 Unit(s)/Hr (12 mL/Hr) IV Continuous <Continuous>  polyethylene glycol 3350 17 Gram(s) Oral daily  senna 2 Tablet(s) Oral at bedtime    MEDICATIONS  (PRN):  acetaminophen     Tablet .. 650 milliGRAM(s) Oral every 6 hours PRN Temp greater or equal to 38.5C (101.3F), Moderate Pain (4 - 6)  ondansetron Injectable 4 milliGRAM(s) IV Push every 6 hours PRN Nausea and/or Vomiting    Allergies    No Known Allergies    Intolerances      Vital Signs Last 24 Hrs  T(C): 37.1 (23 Dec 2022 05:14), Max: 37.3 (22 Dec 2022 08:38)  T(F): 98.7 (23 Dec 2022 05:14), Max: 99.1 (22 Dec 2022 08:38)  HR: 78 (23 Dec 2022 04:18) (76 - 88)  BP: 124/80 (23 Dec 2022 04:18) (124/80 - 142/90)  BP(mean): 97 (23 Dec 2022 04:18) (97 - 99)  RR: 17 (23 Dec 2022 04:18) (17 - 18)  SpO2: 99% (23 Dec 2022 04:18) (98% - 99%)    Parameters below as of 23 Dec 2022 04:18  Patient On (Oxygen Delivery Method): room air        Physical exam:  Constitutional: No acute distress, conversant  Eyes: Anicteric sclerae, moist conjunctivae, see below for CNs  Neck: trachea midline, FROM, supple, no thyromegaly or lymphadenopathy  Cardiovascular: Regular rate and rhythm, no murmurs, rubs, or gallops. No carotid bruits.   Pulmonary: Anterior breath sounds clear bilaterally, no crackles or wheezing. No use of accessory muscles  GI: Abdomen soft, non-distended, non-tender  Extremities: Radial and DP pulses +2, no edema    Neurologic:  -Mental status: Awake, alert, oriented to person, place, and time. Speech is fluent with intact naming, repetition, and comprehension, no dysarthria. Recent and remote memory intact. Follows commands. Attention/concentration intact. Fund of knowledge appropriate.  -Cranial nerves:   II: Visual fields are full to confrontation.  III, IV, VI: Extraocular movements are intact without nystagmus. Pupils equally round and reactive to light  V:  Facial sensation V1-V3 equal and intact   VII: Face is symmetric with normal eye closure and smile  VIII: Hearing is bilaterally intact to finger rub  IX, X: Uvula is midline and soft palate rises symmetrically  XI: Head turning and shoulder shrug are intact.  XII: Tongue protrudes midline  Motor: Normal bulk and tone. No pronator drift. Strength bilateral upper extremity 5/5, bilateral lower extremities 5/5.  Rapid alternating movements intact and symmetric  Sensation: Intact to light touch bilaterally. No neglect or extinction on double simultaneous testing.  Coordination: trace RUE dysmetria,  Reflexes: Downgoing toes bilaterally   Gait: Narrow gait and steady    NIHSS: 0 ASPECT Score: ***** ICH Score: ****** (GCS)    Fingerstick Blood Glucose: CAPILLARY BLOOD GLUCOSE        LABS:                        12.6   5.28  )-----------( 173      ( 22 Dec 2022 05:08 )             36.9     12-22    136  |  103  |  9   ----------------------------<  94  3.9   |  23  |  1.02    Ca    9.0      22 Dec 2022 05:08      PT/INR - ( 22 Dec 2022 05:08 )   PT: 15.1 sec;   INR: 1.27          PTT - ( 23 Dec 2022 00:55 )  PTT:57.0 sec          RADIOLOGY & ADDITIONAL STUDIES:        RADIOLOGY & ADDITIONAL TESTS:  < from: CT Head No Cont (12.22.22 @ 11:00) >    Impression:    Right greater than left acute/subacute cerebellar hemisphere infarctions   correspondingto reported outside MRI as documented in medical record. No   significant mass effect or midline shift. No acute intracranial   hemorrhage..    < end of copied text >  < from: CT Angio Lower Extremity w/ IV Cont, Bilateral (12.22.22 @ 11:05) >  IMPRESSION:  1. 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 Distal reconstitution of flow in diminutive left   dorsalis pedis.  2. No arterial occlusion in the right lower extremity, three-vessel   runoff down to the foot. Multifocal right anterior tibialis artery   stenosis.  3. 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 Consider   GI consultation and pelvic MRI.    < end of copied text >  < from: Echo W Bubble w/o Doppler Complete (12.22.22 @ 09:01) >  CONCLUSIONS:     1. Normal left and right ventricular size and systolic function.   2. Mild symmetric left ventricular hypertrophy.   3. No significant valvular disease.   4. No evidence of pulmonary hypertension.   5. No pericardial effusion.   6. Injection of agitated saline via a peripheral vein reveals no   evidence of a right-to-left shunt.    < end of copied text >  < from: US Duplex Venous Lower Ext Complete, Bilateral (12.22.22 @ 11:31) >  IMPRESSION:  No evidence of deep venous thrombosis in either lower extremity.    **STROKE CONSULT NOTE**      HPI: 57y Male with PMHx of HTN, previous lumbar fusion (March 2022) who originally was BIBEMS to [facility] on 12/16/22 after an episode of extreme dizziness and gait instability that caused him to fall, unclear whether he hit his head or +LOC, but then describes episode of aphasia, contracture of bilateral hands and confusion. He reports that he lives in the basement of his apartment building and that his upstairs neighbor heard him fall and called an ambulance. Episode lasted approximately about 1-1.5 hours. On arrival to [place], CTH was negative, but MRI demonstrated multiple acute cerebellar ischemic infarcts. He was then started on aspirin, plavix and atorvastatin. 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 Patient was transferred to [facility] for further workup for possible complex bypass surgery. Of note, patient endorses that he had a brother who passed away from complications of multiple strokes but otherwise denies family history of bleeding/clotting disorders.     T(C): 37.1 (12-23-22 @ 05:14), Max: 37.3 (12-22-22 @ 08:38)  HR: 78 (12-23-22 @ 04:18) (76 - 88)  BP: 124/80 (12-23-22 @ 04:18) (124/80 - 142/90)  RR: 17 (12-23-22 @ 04:18) (17 - 18)  SpO2: 99% (12-23-22 @ 04:18) (98% - 99%)    PAST MEDICAL & SURGICAL HISTORY:  HTN (hypertension)      History of lumbar fusion          FAMILY HISTORY:  Family history stroke in brother (Sibling)      ROS:   Constitutional: No fever, weight loss or fatigue  Eyes: No eye pain, visual disturbances, or discharge  ENMT:  No difficulty hearing, tinnitus; No sinus or throat pain  Neck: No pain or stiffness  Respiratory: No cough, wheezing, chills or hemoptysis  Cardiovascular: No chest pain, palpitations, shortness of breath, or leg swelling  Gastrointestinal: No abdominal pain. No nausea, vomiting or hematemesis; No diarrhea or constipation. Nohematochezia.  Genitourinary: No dysuria, frequency, hematuria or incontinence  Neurological: As per HPI  Skin: No itching, burning, rashes or lesions   Endocrine: No heat or cold intolerance; No hair loss  Musculoskeletal: No joint pain or swelling; No muscle, back or extremity pain  Heme/Lymph: No easy bruising or bleeding gums    MEDICATIONS  (STANDING):  aspirin enteric coated 81 milliGRAM(s) Oral daily  atorvastatin 80 milliGRAM(s) Oral at bedtime  heparin  Infusion. 1200 Unit(s)/Hr (12 mL/Hr) IV Continuous <Continuous>  polyethylene glycol 3350 17 Gram(s) Oral daily  senna 2 Tablet(s) Oral at bedtime    MEDICATIONS  (PRN):  acetaminophen     Tablet .. 650 milliGRAM(s) Oral every 6 hours PRN Temp greater or equal to 38.5C (101.3F), Moderate Pain (4 - 6)  ondansetron Injectable 4 milliGRAM(s) IV Push every 6 hours PRN Nausea and/or Vomiting    Allergies    No Known Allergies    Intolerances      Vital Signs Last 24 Hrs  T(C): 37.1 (23 Dec 2022 05:14), Max: 37.3 (22 Dec 2022 08:38)  T(F): 98.7 (23 Dec 2022 05:14), Max: 99.1 (22 Dec 2022 08:38)  HR: 78 (23 Dec 2022 04:18) (76 - 88)  BP: 124/80 (23 Dec 2022 04:18) (124/80 - 142/90)  BP(mean): 97 (23 Dec 2022 04:18) (97 - 99)  RR: 17 (23 Dec 2022 04:18) (17 - 18)  SpO2: 99% (23 Dec 2022 04:18) (98% - 99%)    Parameters below as of 23 Dec 2022 04:18  Patient On (Oxygen Delivery Method): room air    Neurologic:  -Mental status: Awake, alert, oriented to person, place, and time. Speech is fluent with intact naming, repetition, and comprehension, no dysarthria. Recent and remote memory intact. Follows commands. Attention/concentration intact. Fund of knowledge appropriate.  -Cranial nerves:   II: Visual fields are full to confrontation.  III, IV, VI: Extraocular movements are intact without nystagmus. Pupils equally round and reactive to light  V:  Facial sensation V1-V3 equal and intact   VII: Face is symmetric with normal eye closure and smile  Motor: Normal bulk and tone. No pronator drift. Strength bilateral upper extremity 5/5, bilateral lower extremities 5/5.  Rapid alternating movements intact and symmetric  Sensation: Intact to light touch bilaterally. No neglect or extinction on double simultaneous testing.  Coordination: trace RUE dysmetria  Reflexes: Downgoing toes bilaterally   Gait: Narrow gait and steady    NIHSS: 1    Fingerstick Blood Glucose: CAPILLARY BLOOD GLUCOSE        LABS:                        12.6   5.28  )-----------( 173      ( 22 Dec 2022 05:08 )             36.9     12-22    136  |  103  |  9   ----------------------------<  94  3.9   |  23  |  1.02    Ca    9.0      22 Dec 2022 05:08      PT/INR - ( 22 Dec 2022 05:08 )   PT: 15.1 sec;   INR: 1.27          PTT - ( 23 Dec 2022 00:55 )  PTT:57.0 sec          RADIOLOGY & ADDITIONAL STUDIES:        RADIOLOGY & ADDITIONAL TESTS:  < from: CT Head No Cont (12.22.22 @ 11:00) >    Impression:    Right greater than left acute/subacute cerebellar hemisphere infarctions   correspondingto reported outside MRI as documented in medical record. No   significant mass effect or midline shift. No acute intracranial   hemorrhage..    < end of copied text >  < from: CT Angio Lower Extremity w/ IV Cont, Bilateral (12.22.22 @ 11:05) >  IMPRESSION:  1. 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 Distal reconstitution of flow in diminutive left   dorsalis pedis.  2. No arterial occlusion in the right lower extremity, three-vessel   runoff down to the foot. Multifocal right anterior tibialis artery   stenosis.  3. 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 Consider   GI consultation and pelvic MRI.    < end of copied text >  < from: Echo W Bubble w/o Doppler Complete (12.22.22 @ 09:01) >  CONCLUSIONS:     1. Normal left and right ventricular size and systolic function.   2. Mild symmetric left ventricular hypertrophy.   3. No significant valvular disease.   4. No evidence of pulmonary hypertension.   5. No pericardial effusion.   6. Injection of agitated saline via a peripheral vein reveals no   evidence of a right-to-left shunt.    < end of copied text >  < from: US Duplex Venous Lower Ext Complete, Bilateral (12.22.22 @ 11:31) >  IMPRESSION:  No evidence of deep venous thrombosis in either lower extremity.    < from: VA Duplex Lower Extrem Arterial, Bilat (12.22.22 @ 19:47) >  IMPRESSION:  Near complete occlusion of left anterior tibial artery with reversal of   flow at the distal aspectof the artery.    < end of copied text >

## 2022-12-23 NOTE — CONSULT NOTE ADULT - NS ATTEND AMEND GEN_ALL_CORE FT
The patient is a 57-year-old gentleman with history of hypertension and lumbar fusion who is admitted for further evaluation after presenting with vertigo/gait instability in the setting of diminutive L VA and right vertebral artery occlusion with MRI showing previous acute cerebellar infarcts.  The patient denies any personal history of clotting issues but does report a brother who had multiple strokes and a strong family h/o cancer.  He was not on any antithrombotic therapy/statin previously.     During this admission, the patient was found to have AT artery thrombus, asymptomatic (vascular following), as well as pelvic/rectal mass (GI following). Colonoscopy, CT CAP, cancer markers pending. NOVA pending. Rec hypercoag testing, TTE with bubble (DVT US negative). Agree w IV Heparin + ASA, statin. Further workup pending results of above.

## 2022-12-23 NOTE — CONSULT NOTE ADULT - TIME BILLING
review of patient information including recent vital signs, labs, imaging, and notes; assessing, examining patient; updating patient/family; discussion and coordination of care with multidisciplinary team.
New consult  - cerebellar stroke  - vertebral artery occlusion  - Rectal mass

## 2022-12-23 NOTE — PROGRESS NOTE ADULT - PROBLEM SELECTOR PLAN 1
Possible bypass per NSG  - dopplers w/o DVT  - c/w ASA, holding plavix for possible procedures  - c/w statin  - LE CT angio incidentally found rectal mass, see plan as below  - surgical planning per NSG team

## 2022-12-23 NOTE — PATIENT PROFILE ADULT - FALL HARM RISK - HARM RISK INTERVENTIONS
Communicate Risk of Fall with Harm to all staff/Reinforce activity limits and safety measures with patient and family/Tailored Fall Risk Interventions/Visual Cue: Yellow wristband and red socks/Bed in lowest position, wheels locked, appropriate side rails in place/Call bell, personal items and telephone in reach/Instruct patient to call for assistance before getting out of bed or chair/Non-slip footwear when patient is out of bed/[place] to call system/Physically safe environment - no spills, clutter or unnecessary equipment/Purposeful Proactive Rounding/Room/bathroom lighting operational, light cord in reach Graft Donor Site Bandage (Optional-Leave Blank If You Don't Want In Note): Pressure bandage was applied to the donor site.

## 2022-12-23 NOTE — CONSULT NOTE ADULT - REASON FOR ADMISSION
stroke workup, possible bypass candidate

## 2022-12-23 NOTE — PROGRESS NOTE ADULT - PROBLEM SELECTOR PLAN 4
New rectal mass seen on CT LE angio  - Possible GIST per read, vs colorectal cancer.  GI consulted, appreciate recs  - patient has never had C scope, no family hx of GI malignancies  - plan for colonoscopy with bx 12/23  - Colorectal surgery following  - Tumor markers not elevated  - CT C/A/P to eval for extent of disease

## 2022-12-23 NOTE — CONSULT NOTE ADULT - ASSESSMENT
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 Patient was transferred to [facility] for further workup for possible complex bypass surgery.       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 Patient was transferred to [facility] for further workup for possible complex bypass surgery.     - would obtain hypercoag panel with genetic testing given findings of near complete occlusion of left tibial artery as well as occluded right vertebral artery    Discussed with Neurology Attending Dr. Es Jorge

## 2022-12-23 NOTE — PROGRESS NOTE ADULT - ASSESSMENT
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 Patient was transferred to [facility] for further workup for possible complex bypass surgery.  Hospital course complicated by finding of new rectal mass

## 2022-12-23 NOTE — PRE-ANESTHESIA EVALUATION ADULT - BSA (M2)
RN cannot approve Refill Request    RN can NOT refill this medication med is not covered by policy/route to provider.       Stella Paige, [facility] Triage/Med Refill 2/24/2020    Requested Prescriptions   Pending Prescriptions Disp Refills     tiZANidine (ZANAFLEX) 4 MG tablet [Pharmacy Med Name: TIZANIDINE 4MG TABLETS] 60 tablet 0     Sig: TAKE 1 TABLET BY MOUTH TWICE DAILY AS NEEDED FOR HEADACHE OR MUSCLE SPASM       There is no refill protocol information for this order      Signed Prescriptions Disp Refills    levothyroxine (SYNTHROID, LEVOTHROID) 88 MCG tablet 30 tablet 0     Sig: TAKE 1 TABLET BY MOUTH EVERY DAY       Thyroid Hormones Protocol Passed - 2/21/2020  9:34 AM        Passed - Provider visit in past 12 months or next 3 months     Last office visit with prescriber/PCP: 3/26/2019 Abby Joshi MD OR same dept: 3/26/2019 Abby Joshi MD OR same specialty: 3/26/2019 Abby Joshi MD  Last physical: Visit date not found Last MTM visit: Visit date not found   Next visit within 3 mo: Visit date not found  Next physical within 3 mo: Visit date not found  Prescriber OR PCP: Abby Joshi MD  Last diagnosis associated with med order: 1. Hypothyroidism  - levothyroxine (SYNTHROID, LEVOTHROID) 88 MCG tablet; TAKE 1 TABLET BY MOUTH EVERY DAY  Dispense: 30 tablet; Refill: 0    2. Migraine  - tiZANidine (ZANAFLEX) 4 MG tablet [Pharmacy Med Name: TIZANIDINE 4MG TABLETS]; TAKE 1 TABLET BY MOUTH TWICE DAILY AS NEEDED FOR HEADACHE OR MUSCLE SPASM  Dispense: 60 tablet; Refill: 0    If protocol passes may refill for 12 months if within 3 months of last provider visit (or a total of 15 months).             Passed - TSH on file in past 12 months for patient age 12 & older     TSH   Date Value Ref Range Status   03/26/2019 0.06 (L) 0.30 - 5.00 uIU/mL Final                      
Refill Approved    Rx renewed per Medication Renewal Policy. Medication was last renewed on 10/14/19.    Stella Paige, [facility] Triage/Med Refill 2/24/2020     Requested Prescriptions   Pending Prescriptions Disp Refills     levothyroxine (SYNTHROID, LEVOTHROID) 88 MCG tablet [Pharmacy Med Name: LEVOTHYROXINE 0.088MG (88MCG) TAB] 90 tablet 0     Sig: TAKE 1 TABLET BY MOUTH EVERY DAY       Thyroid Hormones Protocol Passed - 2/21/2020  9:34 AM        Passed - Provider visit in past 12 months or next 3 months     Last office visit with prescriber/PCP: 3/26/2019 Abby Joshi MD OR same dept: 3/26/2019 Abby Joshi MD OR same specialty: 3/26/2019 Abby Joshi MD  Last physical: Visit date not found Last MTM visit: Visit date not found   Next visit within 3 mo: Visit date not found  Next physical within 3 mo: Visit date not found  Prescriber OR PCP: Abby Joshi MD  Last diagnosis associated with med order: 1. Hypothyroidism  - levothyroxine (SYNTHROID, LEVOTHROID) 88 MCG tablet [Pharmacy Med Name: LEVOTHYROXINE 0.088MG (88MCG) TAB]; TAKE 1 TABLET BY MOUTH EVERY DAY  Dispense: 90 tablet; Refill: 0    2. Migraine  - tiZANidine (ZANAFLEX) 4 MG tablet [Pharmacy Med Name: TIZANIDINE 4MG TABLETS]; TAKE 1 TABLET BY MOUTH TWICE DAILY AS NEEDED FOR HEADACHE OR MUSCLE SPASM  Dispense: 60 tablet; Refill: 0    If protocol passes may refill for 12 months if within 3 months of last provider visit (or a total of 15 months).             Passed - TSH on file in past 12 months for patient age 12 & older     TSH   Date Value Ref Range Status   03/26/2019 0.06 (L) 0.30 - 5.00 uIU/mL Final                   tiZANidine (ZANAFLEX) 4 MG tablet [Pharmacy Med Name: TIZANIDINE 4MG TABLETS] 60 tablet 0     Sig: TAKE 1 TABLET BY MOUTH TWICE DAILY AS NEEDED FOR HEADACHE OR MUSCLE SPASM       There is no refill protocol information for this order                    
2.23

## 2022-12-23 NOTE — CONSULT NOTE ADULT - CONSULT REASON
Left anterior tibial a. occlusion
rectal mass
AT artery occlusion/stenosis
Rectal mass
core measures/ bypass
Co management

## 2022-12-23 NOTE — PROGRESS NOTE ADULT - SUBJECTIVE AND OBJECTIVE BOX
SUBJECTIVE:  Patient seen and examined at bedside.  Finished bowel prep and reports clear stool output.  Some abdominal cramps with the bowel prep but no other new symptoms    ROS:  Denies fevers, chills, headache, vision changes, neck pain, cough, SOB, chest pain, Abdominal pain, N/V, dysuria or new rash.  All other ROS negative except as above    Vital Signs Last 12 Hrs  T(F): 99.4 (12-23-22 @ 09:01), Max: 99.4 (12-23-22 @ 09:01)  HR: 80 (12-23-22 @ 12:09) (78 - 88)  BP: 141/99 (12-23-22 @ 12:09) (124/80 - 146/89)  BP(mean): 110 (12-23-22 @ 12:09) (97 - 110)  RR: 18 (12-23-22 @ 12:09) (17 - 18)  SpO2: 98% (12-23-22 @ 12:09) (96% - 99%)  I&O's Summary    22 Dec 2022 07:01  -  23 Dec 2022 07:00  --------------------------------------------------------  IN: 84 mL / OUT: 2955 mL / NET: -2871 mL        PHYSICAL EXAM:  Constitutional: NAD, comfortable in bed.  HEENT: PERRLA, EOMI, no conjunctival pallor or scleral icterus, MMM  Neck: Supple, no JVD  Respiratory: CTA B/L. No w/r/r.   Cardiovascular: RRR, normal S1 and S2, no m/r/g.   Gastrointestinal: +BS, soft NTND, no guarding or rebound tenderness, no palpable masses   Extremities: wwp; no cyanosis, clubbing or edema.   Vascular: Pulses equal and strong throughout.   Neurological: AAOx3, no CN deficits, strength and sensation intact throughout.   Skin: No gross skin abnormalities or rashes        LABS:                        13.5   4.77  )-----------( 194      ( 23 Dec 2022 05:30 )             39.3     12-23    137  |  102  |  6<L>  ----------------------------<  105<H>  3.6   |  24  |  0.81    Ca    8.8      23 Dec 2022 05:30  Phos  2.7     12-23  Mg     2.1     12-23      PT/INR - ( 23 Dec 2022 05:30 )   PT: 15.0 sec;   INR: 1.26          PTT - ( 23 Dec 2022 05:30 )  PTT:31.5 sec        RADIOLOGY & ADDITIONAL TESTS:  < from: MR Head w/wo IV Cont (12.23.22 @ 10:24) >  IMPRESSION:    Examination is degraded by motion.    MRI brain: Evolving subacute right greater than left cerebellar   infarctions with patchy enhancement which is typical of subacute   infarctions. No new infarction or hemorrhage.    MRABRAIN: Findings consistent with patient's known right vertebral   artery occlusion. Attenuated caliber and flow-related signal within the   intracranial left vertebral artery which is similar to prior CTA 1   accounting for differences in technique. Lack of flow related signal   within the proximal to mid basilar artery which did demonstrate contrast   filling on prior CTA head. Although this may be artifactual due to   motion, underlying stenosis/occlusion cannot be excluded.    < end of copied text >      MEDICATIONS  (STANDING):  aspirin enteric coated 81 milliGRAM(s) Oral daily  atorvastatin 80 milliGRAM(s) Oral at bedtime  heparin  Infusion. 1200 Unit(s)/Hr (12 mL/Hr) IV Continuous <Continuous>  polyethylene glycol 3350 17 Gram(s) Oral daily  senna 2 Tablet(s) Oral at bedtime    MEDICATIONS  (PRN):  acetaminophen     Tablet .. 650 milliGRAM(s) Oral every 6 hours PRN Temp greater or equal to 38.5C (101.3F), Moderate Pain (4 - 6)  ondansetron Injectable 4 milliGRAM(s) IV Push every 6 hours PRN Nausea and/or Vomiting

## 2022-12-23 NOTE — PROGRESS NOTE ADULT - SUBJECTIVE AND OBJECTIVE BOX
SUBJECTIVE: Pt seen and examined at bedside this am.      MEDICATIONS  (STANDING):  aspirin enteric coated 81 milliGRAM(s) Oral daily  atorvastatin 80 milliGRAM(s) Oral at bedtime  heparin  Infusion. 1200 Unit(s)/Hr (12 mL/Hr) IV Continuous <Continuous>  polyethylene glycol 3350 17 Gram(s) Oral daily  saline laxative (FLEET) Rectal Enema 1 Enema Rectal once  senna 2 Tablet(s) Oral at bedtime    MEDICATIONS  (PRN):  acetaminophen     Tablet .. 650 milliGRAM(s) Oral every 6 hours PRN Temp greater or equal to 38.5C (101.3F), Moderate Pain (4 - 6)  ondansetron Injectable 4 milliGRAM(s) IV Push every 6 hours PRN Nausea and/or Vomiting      Vital Signs Last 24 Hrs  T(C): 37.1 (23 Dec 2022 05:14), Max: 37.3 (22 Dec 2022 17:26)  T(F): 98.7 (23 Dec 2022 05:14), Max: 99.1 (22 Dec 2022 17:26)  HR: 88 (23 Dec 2022 08:23) (78 - 88)  BP: 143/91 (23 Dec 2022 08:23) (124/80 - 146/89)  BP(mean): 108 (23 Dec 2022 08:23) (97 - 110)  RR: 18 (23 Dec 2022 08:23) (17 - 18)  SpO2: 98% (23 Dec 2022 08:23) (96% - 99%)    Parameters below as of 23 Dec 2022 08:23  Patient On (Oxygen Delivery Method): room air        Physical Exam  General: NAD, resting comfortably in bed  Pulmonary: Nonlabored breathing, no respiratory distress  CV: NSR  Abd: soft, NT/ND  Extremities: (-) edema, warm, well-perfused      I&O's Detail    22 Dec 2022 07:01  -  23 Dec 2022 07:00  --------------------------------------------------------  IN:    Heparin Infusion: 84 mL  Total IN: 84 mL    OUT:    Voided (mL): 2955 mL  Total OUT: 2955 mL    Total NET: -3081 mL          LABS:                        13.5   4.77  )-----------( 194      ( 23 Dec 2022 05:30 )             39.3     12-23    137  |  102  |  6<L>  ----------------------------<  105<H>  3.6   |  24  |  0.81    Ca    8.8      23 Dec 2022 05:30  Phos  2.7     12-23  Mg     2.1     12-23      PT/INR - ( 23 Dec 2022 05:30 )   PT: 15.0 sec;   INR: 1.26          PTT - ( 23 Dec 2022 05:30 )  PTT:31.5 sec      RADIOLOGY & ADDITIONAL STUDIES:

## 2022-12-23 NOTE — PRE-ANESTHESIA EVALUATION ADULT - NSANTHOSAYNRD_GEN_A_CORE
No. ZOË screening performed.  STOP BANG Legend: 0-2 = LOW Risk; 3-4 = INTERMEDIATE Risk; 5-8 = HIGH Risk

## 2022-12-24 LAB
ALBUMIN SERPL ELPH-MCNC: 3.8 G/DL — SIGNIFICANT CHANGE UP (ref 3.3–5)
ALP SERPL-CCNC: 96 U/L — SIGNIFICANT CHANGE UP (ref 40–120)
ALT FLD-CCNC: 17 U/L — SIGNIFICANT CHANGE UP (ref 10–45)
ANION GAP SERPL CALC-SCNC: 12 MMOL/L — SIGNIFICANT CHANGE UP (ref 5–17)
APTT BLD: 36.8 SEC — HIGH (ref 27.5–35.5)
APTT BLD: 40.6 SEC — HIGH (ref 27.5–35.5)
APTT BLD: 55.7 SEC — HIGH (ref 27.5–35.5)
APTT BLD: 72.3 SEC — HIGH (ref 27.5–35.5)
AST SERPL-CCNC: 21 U/L — SIGNIFICANT CHANGE UP (ref 10–40)
B2 GLYCOPROT1 AB SER QL: NEGATIVE — SIGNIFICANT CHANGE UP
BILIRUB SERPL-MCNC: 0.3 MG/DL — SIGNIFICANT CHANGE UP (ref 0.2–1.2)
BUN SERPL-MCNC: 9 MG/DL — SIGNIFICANT CHANGE UP (ref 7–23)
CALCIUM SERPL-MCNC: 9 MG/DL — SIGNIFICANT CHANGE UP (ref 8.4–10.5)
CHLORIDE SERPL-SCNC: 102 MMOL/L — SIGNIFICANT CHANGE UP (ref 96–108)
CO2 SERPL-SCNC: 23 MMOL/L — SIGNIFICANT CHANGE UP (ref 22–31)
CREAT SERPL-MCNC: 0.9 MG/DL — SIGNIFICANT CHANGE UP (ref 0.5–1.3)
EGFR: 100 ML/MIN/1.73M2 — SIGNIFICANT CHANGE UP
GLUCOSE SERPL-MCNC: 110 MG/DL — HIGH (ref 70–99)
HCT VFR BLD CALC: 39.4 % — SIGNIFICANT CHANGE UP (ref 39–50)
HGB BLD-MCNC: 13.4 G/DL — SIGNIFICANT CHANGE UP (ref 13–17)
INR BLD: 1.31 — HIGH (ref 0.88–1.16)
MAGNESIUM SERPL-MCNC: 2.1 MG/DL — SIGNIFICANT CHANGE UP (ref 1.6–2.6)
MCHC RBC-ENTMCNC: 29.1 PG — SIGNIFICANT CHANGE UP (ref 27–34)
MCHC RBC-ENTMCNC: 34 GM/DL — SIGNIFICANT CHANGE UP (ref 32–36)
MCV RBC AUTO: 85.5 FL — SIGNIFICANT CHANGE UP (ref 80–100)
NRBC # BLD: 0 /100 WBCS — SIGNIFICANT CHANGE UP (ref 0–0)
PHOSPHATE SERPL-MCNC: 3 MG/DL — SIGNIFICANT CHANGE UP (ref 2.5–4.5)
PLATELET # BLD AUTO: 194 K/UL — SIGNIFICANT CHANGE UP (ref 150–400)
POTASSIUM SERPL-MCNC: 3.8 MMOL/L — SIGNIFICANT CHANGE UP (ref 3.5–5.3)
POTASSIUM SERPL-SCNC: 3.8 MMOL/L — SIGNIFICANT CHANGE UP (ref 3.5–5.3)
PROT SERPL-MCNC: 7.1 G/DL — SIGNIFICANT CHANGE UP (ref 6–8.3)
PROTHROM AB SERPL-ACNC: 15.6 SEC — HIGH (ref 10.5–13.4)
RBC # BLD: 4.61 M/UL — SIGNIFICANT CHANGE UP (ref 4.2–5.8)
RBC # FLD: 14.2 % — SIGNIFICANT CHANGE UP (ref 10.3–14.5)
SODIUM SERPL-SCNC: 137 MMOL/L — SIGNIFICANT CHANGE UP (ref 135–145)
WBC # BLD: 5.26 K/UL — SIGNIFICANT CHANGE UP (ref 3.8–10.5)
WBC # FLD AUTO: 5.26 K/UL — SIGNIFICANT CHANGE UP (ref 3.8–10.5)

## 2022-12-24 PROCEDURE — 74177 CT ABD & PELVIS W/CONTRAST: CPT | Mod: 26

## 2022-12-24 PROCEDURE — 71260 CT THORAX DX C+: CPT | Mod: 26

## 2022-12-24 PROCEDURE — 99232 SBSQ HOSP IP/OBS MODERATE 35: CPT | Mod: GC

## 2022-12-24 PROCEDURE — 99232 SBSQ HOSP IP/OBS MODERATE 35: CPT

## 2022-12-24 RX ORDER — HEPARIN SODIUM 5000 [USP'U]/ML
1300 INJECTION INTRAVENOUS; SUBCUTANEOUS
Qty: 25000 | Refills: 0 | Status: DISCONTINUED | OUTPATIENT
Start: 2022-12-24 | End: 2022-12-25

## 2022-12-24 RX ORDER — IOHEXOL 300 MG/ML
30 INJECTION, SOLUTION INTRAVENOUS ONCE
Refills: 0 | Status: DISCONTINUED | OUTPATIENT
Start: 2022-12-24 | End: 2022-12-24

## 2022-12-24 RX ORDER — DIATRIZOATE MEGLUMINE 180 MG/ML
30 INJECTION, SOLUTION INTRAVESICAL ONCE
Refills: 0 | Status: COMPLETED | OUTPATIENT
Start: 2022-12-24 | End: 2022-12-24

## 2022-12-24 RX ORDER — HEPARIN SODIUM 5000 [USP'U]/ML
1200 INJECTION INTRAVENOUS; SUBCUTANEOUS
Qty: 25000 | Refills: 0 | Status: DISCONTINUED | OUTPATIENT
Start: 2022-12-24 | End: 2022-12-24

## 2022-12-24 RX ORDER — HEPARIN SODIUM 5000 [USP'U]/ML
1100 INJECTION INTRAVENOUS; SUBCUTANEOUS
Qty: 25000 | Refills: 0 | Status: DISCONTINUED | OUTPATIENT
Start: 2022-12-24 | End: 2022-12-24

## 2022-12-24 RX ORDER — POTASSIUM CHLORIDE 20 MEQ
20 PACKET (EA) ORAL
Refills: 0 | Status: COMPLETED | OUTPATIENT
Start: 2022-12-24 | End: 2022-12-24

## 2022-12-24 RX ADMIN — HEPARIN SODIUM 11 UNIT(S)/HR: 5000 INJECTION INTRAVENOUS; SUBCUTANEOUS at 17:52

## 2022-12-24 RX ADMIN — HEPARIN SODIUM 13 UNIT(S)/HR: 5000 INJECTION INTRAVENOUS; SUBCUTANEOUS at 23:38

## 2022-12-24 RX ADMIN — HEPARIN SODIUM 12 UNIT(S)/HR: 5000 INJECTION INTRAVENOUS; SUBCUTANEOUS at 00:01

## 2022-12-24 RX ADMIN — Medication 81 MILLIGRAM(S): at 14:10

## 2022-12-24 RX ADMIN — DIATRIZOATE MEGLUMINE 30 MILLILITER(S): 180 INJECTION, SOLUTION INTRAVESICAL at 10:35

## 2022-12-24 RX ADMIN — ATORVASTATIN CALCIUM 80 MILLIGRAM(S): 80 TABLET, FILM COATED ORAL at 21:47

## 2022-12-24 RX ADMIN — Medication 20 MILLIEQUIVALENT(S): at 15:56

## 2022-12-24 RX ADMIN — Medication 20 MILLIEQUIVALENT(S): at 14:09

## 2022-12-24 NOTE — PROGRESS NOTE ADULT - ASSESSMENT
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 On examination, pt reports feeling well and was in his normal state of health prior to this recent neurologic event. Denies recent abdominal pain, nausea, or vomiting. Denies recent weight loss, blood in his stool, or changes in stool caliber. States that he is a current smoker (cigarettes and marijuana), but denies alcohol intake. Denies family history of IBS, Crohns, UC, or colon cancer. Of note, pt has never had a colonoscopy.    Recommendations:  Complete staging workup for possible colorectal malignancy  GI already consulted - will follow up recs  Additional recommendations pending the above  Rest of care per primary team  Surgery team 1C will continue to follow

## 2022-12-24 NOTE — PROGRESS NOTE ADULT - ASSESSMENT
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 Patient was transferred to [facility] for further workup for possible complex bypass surgery.  Hospital course complicated by finding of new rectal mass, s/p flex sig on 12/24 with visualization limited due to perirectal narrowing and mass.

## 2022-12-24 NOTE — PROGRESS NOTE ADULT - PROBLEM SELECTOR PLAN 4
New rectal mass seen on CT LE angio  - flex sig with lower EUS with bx 12/23 -- 9cm john-rectal mass, unable to visualize mucosa due to rectal narrowing and perirectal mass  - f/u flex sig biopsy.  - Colorectal surgery following  - Tumor markers not elevated  - CT C/A/P to eval for extent of disease New rectal mass seen on CT LE angio  - flex sig with lower EUS with bx 12/23 -- 9cm john-rectal mass, unable to visualize mucosa due to rectal narrowing and perirectal mass  - f/u flex sig biopsy.  - Colorectal surgery following  - Tumor markers not elevated  - CT C/A/P to eval for extent of disease  - labs noted, CBC stable

## 2022-12-24 NOTE — PROGRESS NOTE ADULT - SUBJECTIVE AND OBJECTIVE BOX
GASTROENTEROLOGY PROGRESS NOTE  Patient seen and examined at bedside.  No acute complaints.  S/p flex sig/ EUS yesterday with bx    PERTINENT REVIEW OF SYSTEMS:  CONSTITUTIONAL: No weakness, fevers or chills  HEENT: No visual changes; No vertigo or throat pain   GASTROINTESTINAL: As above.  NEUROLOGICAL: No numbness or weakness  SKIN: No itching, burning, rashes, or lesions     Allergies    No Known Allergies    Intolerances      MEDICATIONS:  MEDICATIONS  (STANDING):  aspirin enteric coated 81 milliGRAM(s) Oral daily  atorvastatin 80 milliGRAM(s) Oral at bedtime  heparin  Infusion 1200 Unit(s)/Hr (12 mL/Hr) IV Continuous <Continuous>  polyethylene glycol 3350 17 Gram(s) Oral daily  potassium chloride    Tablet ER 20 milliEquivalent(s) Oral every 2 hours  senna 2 Tablet(s) Oral at bedtime    MEDICATIONS  (PRN):  acetaminophen     Tablet .. 650 milliGRAM(s) Oral every 6 hours PRN Temp greater or equal to 38.5C (101.3F), Moderate Pain (4 - 6)  ondansetron Injectable 4 milliGRAM(s) IV Push every 6 hours PRN Nausea and/or Vomiting    Vital Signs Last 24 Hrs  T(C): 36.7 (24 Dec 2022 09:10), Max: 37.4 (23 Dec 2022 13:00)  T(F): 98.1 (24 Dec 2022 09:10), Max: 99.3 (23 Dec 2022 13:00)  HR: 90 (24 Dec 2022 11:50) (74 - 90)  BP: 145/97 (24 Dec 2022 11:50) (128/80 - 145/97)  BP(mean): 117 (24 Dec 2022 11:50) (92 - 117)  RR: 18 (24 Dec 2022 11:50) (17 - 18)  SpO2: 97% (24 Dec 2022 11:50) (97% - 98%)    Parameters below as of 24 Dec 2022 11:50  Patient On (Oxygen Delivery Method): room air        12-23 @ 07:01  -  12-24 @ 07:00  --------------------------------------------------------  IN: 792 mL / OUT: 350 mL / NET: 442 mL    12-24 @ 07:01  -  12-24 @ 12:38  --------------------------------------------------------  IN: 528 mL / OUT: 0 mL / NET: 528 mL      PHYSICAL EXAM:    General: in no acute distress  HEENT: MMM, conjunctiva and sclera clear  Gastrointestinal: Soft non-tender non-distended; No rebound or guarding  Skin: Warm and dry. No obvious rash    LABS:                        13.4   5.26  )-----------( 194      ( 24 Dec 2022 06:09 )             39.4     12-24    137  |  102  |  9   ----------------------------<  110<H>  3.8   |  23  |  0.90    Ca    9.0      24 Dec 2022 06:09  Phos  3.0     12-24  Mg     2.1     12-24    TPro  7.1  /  Alb  3.8  /  TBili  0.3  /  DBili  x   /  AST  21  /  ALT  17  /  AlkPhos  96  12-24    PT/INR - ( 24 Dec 2022 06:09 )   PT: 15.6 sec;   INR: 1.31          PTT - ( 24 Dec 2022 12:00 )  PTT:72.3 sec                  RADIOLOGY & ADDITIONAL STUDIES:  Reviewed

## 2022-12-24 NOTE — PROGRESS NOTE ADULT - ASSESSMENT
57M PMHx HTN, previous lumbar fusion (March 2022) who originally was BIBEMS to [facility] on 12/16/22 after an episode of extreme dizziness and gait instability that caused him to fall, and an episode of aphasia, contracture of bilateral hands and confusion. 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 Workup for bypass notable for large pelvic mass, suspicious for GIST. GI consulted for consideration of endoscopic evaluation.     No previous colonoscopy evaluation with no familial history of colorectal or GI related malignancy. Has remained asymptomatic with no reported unintentional weight loss, abdominal pain, bloody or black stools or changes in stool caliber.     ddx includes colorectal cancer (risk factors including smoking history (social smoker) and race () vs GIST.    Flex Sig/ EUS 12/23:  - luminal narrowing within the rectum with normal overlying appearing mucosa, suspected to be from compression from perirectal mass noted on imaging  - Approx 9 cm non-mucosal perirectal mass noted, s/p bx    Tumor markers:  CEA 3.2, Ca 19-9 <2    #Rectal mass  -Obtain CT chest, abdomen/pelvis with IV contrast to assess extent of disease  -Please consult colorectal surgery (Dr Barfield)  -No contraindication to resuming ASA 81 mg daily  - Follow up path       Jesica Mora MD  Gastroenterology Fellow, PGY -5   Weekday Pager [phone]

## 2022-12-24 NOTE — PROGRESS NOTE ADULT - SUBJECTIVE AND OBJECTIVE BOX
INTERVAL HPI/OVERNIGHT EVENTS: EDU overnight, heparin drip restarted at midnight. Complaining of intermittent rectal pain, tylenol given.     SUBJECTIVE: Patient seen and examined at bedside. He states pain is well-controlled, able to therese PO without n/v. Denies cp, sob. +F/+BM. OOBA.      aspirin enteric coated 81 milliGRAM(s) Oral daily  heparin  Infusion 1200 Unit(s)/Hr IV Continuous <Continuous>      Vital Signs Last 24 Hrs  T(C): 36.7 (24 Dec 2022 09:10), Max: 37.1 (24 Dec 2022 05:14)  T(F): 98.1 (24 Dec 2022 09:10), Max: 98.7 (24 Dec 2022 05:14)  HR: 90 (24 Dec 2022 11:50) (74 - 90)  BP: 145/97 (24 Dec 2022 11:50) (128/80 - 145/97)  BP(mean): 117 (24 Dec 2022 11:50) (92 - 117)  RR: 18 (24 Dec 2022 11:50) (17 - 18)  SpO2: 97% (24 Dec 2022 11:50) (97% - 98%)    Parameters below as of 24 Dec 2022 11:50  Patient On (Oxygen Delivery Method): room air      I&O's Detail    23 Dec 2022 07:01  -  24 Dec 2022 07:00  --------------------------------------------------------  IN:    Heparin: 72 mL    Oral Fluid: 720 mL  Total IN: 792 mL    OUT:    Voided (mL): 350 mL  Total OUT: 350 mL    Total NET: 442 mL      24 Dec 2022 07:01  -  24 Dec 2022 15:03  --------------------------------------------------------  IN:    Heparin: 48 mL    Oral Fluid: 840 mL  Total IN: 888 mL    OUT:  Total OUT: 0 mL    Total NET: 888 mL          General: NAD, resting comfortably in bed  C/V: NSR  Pulm: Nonlabored breathing, no respiratory distress  Abd: soft, NT/ND.  Extrem: WWP, no edema, SCDs in place        LABS:                        13.4   5.26  )-----------( 194      ( 24 Dec 2022 06:09 )             39.4     12-24    137  |  102  |  9   ----------------------------<  110<H>  3.8   |  23  |  0.90    Ca    9.0      24 Dec 2022 06:09  Phos  3.0     12-24  Mg     2.1     12-24    TPro  7.1  /  Alb  3.8  /  TBili  0.3  /  DBili  x   /  AST  21  /  ALT  17  /  AlkPhos  96  12-24    PT/INR - ( 24 Dec 2022 06:09 )   PT: 15.6 sec;   INR: 1.31          PTT - ( 24 Dec 2022 12:00 )  PTT:72.3 sec      RADIOLOGY & ADDITIONAL STUDIES:

## 2022-12-24 NOTE — PROGRESS NOTE ADULT - PROBLEM SELECTOR PLAN 1
Possible bypass per NSG  - dopplers w/o DVT  - c/w ASA, holding plavix for possible procedures  - c/w statin  - LE CT angio incidentally found rectal mass, see plan as below  - surgical planning per NSG team Possible bypass per NSG  - Vascular consulted, reccs appreciated --  for evaluation of anterior tibial artery for use as a graft for a cerebral bypass. 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

## 2022-12-24 NOTE — PROGRESS NOTE ADULT - SUBJECTIVE AND OBJECTIVE BOX
HPI:  56yo M with PMHx of HTN, previous lumbar fusion (March 2022) who originally was BIBEMS to [facility] on 12/16/22 after an episode of extreme dizziness and gait instability that caused him to fall, unclear whether he hit his head or +LOC, but then describes episode of aphasia, contracture of bilateral hands and confusion. He reports that he lives in the basement of his apartment building and that his upstairs neighbor heard him fall and called an ambulance. Episode lasted approximately about 1-1.5 hours. On arrival to [place], CTH was negative, but MRI demonstrated multiple acute cerebellar ischemic infarcts. He was then started on aspirin, plavix and atorvastatin. 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 Patient was transferred to [facility] for further workup for possible complex bypass surgery. Of note, patient endorses that he had a brother who passed away from complications of multiple strokes but otherwise denies family history of bleeding/clotting disorders.     Denies previous episodes of gait instability or falls, visual changes, syncopal episodes.  (22 Dec 2022 04:32)    OVERNIGHT EVENTS: Started on heparin gtt, neuro stable. Tylenol given for abdominal/rectal pain.    HOSPITAL COURSE:  12/22: Admitted. Plavix dc'd needs to be off for 1 week preop. echo performed, WNL. dopplers negative. CTH shows b/l R>L acute/subacute cerebellar infarcts. 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 vascular consulted, rec b/l arterial dopplers to eval tibial arteries. GI consulted, preop colonoscopy tomorrow. started on heparin gtt for stroke  12/23: EDU overnight, heparin gtt held at 2AM. pending colonoscopy today. Hypercoaguable panel ordered per stroke/neuro recs. Sigmoidoscopy performed multiple biopsies taken. Restarted Aspirin 81 mg. Arterial dopplers demonstrating left tibial artery new complete occlusion, right anterior tibial artery normal. MR Nova confirming right vertebral artery occlusion. MR brain demonstrating cerebellar infarcts no other masses or lesions seen. Stroke consulted and hypercoaguable workup sent.   12/24: EDU overnight, heparin drip restarted at midnight. Complaining of intermittent rectal pain, tylenol given.     Vital Signs Last 24 Hrs  T(C): 37 (23 Dec 2022 19:37), Max: 37.4 (23 Dec 2022 09:01)  T(F): 98.6 (23 Dec 2022 19:37), Max: 99.4 (23 Dec 2022 09:01)  HR: 78 (23 Dec 2022 22:25) (78 - 90)  BP: 128/80 (23 Dec 2022 22:25) (124/80 - 146/89)  BP(mean): 98 (23 Dec 2022 22:25) (92 - 110)  RR: 18 (23 Dec 2022 22:25) (17 - 18)  SpO2: 98% (23 Dec 2022 22:25) (96% - 99%)    Parameters below as of 23 Dec 2022 22:25  Patient On (Oxygen Delivery Method): room air        I&O's Summary    22 Dec 2022 07:01  -  23 Dec 2022 07:00  --------------------------------------------------------  IN: 84 mL / OUT: 2955 mL / NET: -2871 mL        PHYSICAL EXAM:  General: NAD, pt is comfortably sitting up in bed, on room air  HEENT: CN II-XII grossly intact, PERRL 3mm briskly reactive, EOMI b/l, face symmetric, tongue midline, neck FROM. +Visual fields intact. +facial sensation intact in V1/V2/V3 distributions.   Cardiovascular: RRR, normal S1 and S2   Respiratory: lungs CTAB, no wheezing, rhonchi, or crackles   GI: normoactive BS to auscultation, abd soft, NTND   Neuro: A&Ox3, No aphasia, speech clear, +trace RUE dysmetria, no pronator drift. Follows commands.  TAN x4 spontaneously, 5/5 strength in all extremities throughout. Normal heel to shin bilaterally, normal rapid alternating movements. SILT throughout.    Extremities: warm and well perfused.      TUBES/LINES:  [] Sarah  [] Lumbar Drain  [] Wound Drains  [] Others      DIET:  [] NPO  [x] Mechanical  [] Tube feeds    LABS:                        13.2   5.21  )-----------( 177      ( 23 Dec 2022 22:33 )             37.9     12-23    137  |  102  |  6<L>  ----------------------------<  105<H>  3.6   |  24  |  0.81    Ca    8.8      23 Dec 2022 05:30  Phos  2.7     12-23  Mg     2.1     12-23      PT/INR - ( 23 Dec 2022 22:33 )   PT: 15.7 sec;   INR: 1.32          PTT - ( 23 Dec 2022 22:33 )  PTT:31.8 sec        CAPILLARY BLOOD GLUCOSE          Drug Levels: [] N/A    CSF Analysis: [] N/A      Allergies    No Known Allergies    Intolerances      MEDICATIONS:  Antibiotics:    Neuro:  acetaminophen     Tablet .. 650 milliGRAM(s) Oral every 6 hours PRN  ondansetron Injectable 4 milliGRAM(s) IV Push every 6 hours PRN    Anticoagulation:  aspirin enteric coated 81 milliGRAM(s) Oral daily    OTHER:  atorvastatin 80 milliGRAM(s) Oral at bedtime  polyethylene glycol 3350 17 Gram(s) Oral daily  senna 2 Tablet(s) Oral at bedtime    IVF:    CULTURES:    RADIOLOGY & ADDITIONAL TESTS:  < from: MR Head w/wo IV Cont (12.23.22 @ 10:24) >    IMPRESSION:    Examination is degraded by motion.    MRI brain: Evolving subacute right greater than left cerebellar   infarctions with patchy enhancement which is typical of subacute   infarctions. No new infarction or hemorrhage.    MRABRAIN: Findings consistent with patient's known right vertebral   artery occlusion. Attenuated caliber and flow-related signal within the   intracranial left vertebral artery which is similar to prior CTA 1   accounting for differences in technique. Lack of flow related signal   within the proximal to mid basilar artery which did demonstrate contrast   filling on prior CTA head. Although this may be artifactual due to   motion, underlying stenosis/occlusion cannot be excluded.    Findings were discussed with CHANDA Spain by Dr. Marco Ny on   12/23/2022 11:08 AM    < end of copied text >  < from: VA Duplex Lower Extrem Arterial, Bilat (12.22.22 @ 19:47) >    IMPRESSION:  Near complete occlusion of left anterior tibial artery with reversal of   flow at the distal aspectof the artery.    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 Patient was transferred to [facility] for further workup for possible complex bypass surgery.       STROKE    No pertinent family history in first degree relatives    Family history stroke in brother (Sibling)    Handoff    MEWS Score    No pertinent past medical history    HTN (hypertension)    Vertebral artery occlusion, right    Cerebellar infarct    HTN (hypertension)    Rectal mass    Prophylactic measure    No significant past surgical history    History of lumbar fusion    [id number]        PLAN:  NEURO  - Neuro/vitals q4 hours  - ASA 325mg d/c'ed started on aspirin 81mg   - Hold plavix 7 days in setting of possible surgery.   - MR Richards complete consistent w/ R vertebral occlusion  - stroke core measures   - CTH 12/22: R>L acute/subacute cerebellar infarcts  - Possible repeat angiogram next week     CARDIO  - -170  - hold home amlodipine  - echo with bubble 12/22: WNL, EF 64%    PULM  - Satting well on room air     GI   - Regular diet  - CTA demonstrating possible GIST tumor, GI consulted   - Colorectal surgery consulted   - Golytely for colonoscopy 12/23  - bowel regimen     ENDO  - No active issues  - a1c 5.9    RENAL   - voiding     HEME/ONC  - SCDs  - heparin gtt @ 12ml/hr, PTT goal 50-70  - LE dopplers 12/22: negative  - CTA b/l LE 12/22: L anterior tibilalis occlusion, R anterior tibialis stenosis, 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 Jarvis       Assessment:  Present when checked    []  GCS  E   V  M     Heart Failure: []Acute, [] acute on chronic , []chronic  Heart Failure:  [] Diastolic (HFpEF), [] Systolic (HFrEF), []Combined (HFpEF and HFrEF), [] RHF, [] Pulm HTN, [] Other    [] CARRILLO, [] ATN, [] AIN, [] other  [] CKD1, [] CKD2, [] CKD 3, [] CKD 4, [] CKD 5, []ESRD    Encephalopathy: [] Metabolic, [] Hepatic, [] toxic, [] Neurological, [] Other    Abnormal Nurtitional Status: [] malnurtition (see nutrition note), [ ]underweight: BMI < 19, [] morbid obesity: BMI >40, [] Cachexia    [] Sepsis  [] hypovolemic shock,[] cardiogenic shock, [] hemorrhagic shock, [] neuogenic shock  [] Acute Respiratory Failure  []Cerebral edema, [] Brain compression/ herniation,   [] Functional quadriplegia  [] Acute blood loss anemia

## 2022-12-24 NOTE — PROGRESS NOTE ADULT - ATTENDING COMMENTS
57M PMHx HTN, previous lumbar fusion (March 2022) who originally was BIBEMS to [facility] on 12/16/22 after an episode of extreme dizziness and gait instability that caused him to fall, and an episode of aphasia, contracture of bilateral hands and confusion. 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 Workup for bypass notable for large pelvic mass, suspicious for GIST. GI consulted for consideration of endoscopic evaluation.     No previous colonoscopy evaluation with no familial history of colorectal or GI related malignancy. Has remained asymptomatic with no reported unintentional weight loss, abdominal pain, bloody or black stools or changes in stool caliber.     ddx includes colorectal cancer (risk factors including smoking history (social smoker) and race () vs GIST.    Flex Sig/ EUS 12/23:  - luminal narrowing within the rectum with normal overlying appearing mucosa, suspected to be from compression from perirectal mass noted on imaging  - Approx 9 cm non-mucosal perirectal mass noted, s/p bx    Tumor markers:  CEA 3.2, Ca 19-9 <2    #Rectal mass  -Obtain CT chest, abdomen/pelvis with IV contrast to assess extent of disease  -Please consult colorectal surgery (Dr Barfield)  -No contraindication

## 2022-12-24 NOTE — PROGRESS NOTE ADULT - SUBJECTIVE AND OBJECTIVE BOX
SUBJECTIVE:        PHYSICAL EXAM:  Constitutional: NAD, comfortable in bed.  HEENT: PERRLA, EOMI, no conjunctival pallor or scleral icterus, MMM  Neck: Supple, no JVD  Respiratory: CTA B/L. No w/r/r.   Cardiovascular: RRR, normal S1 and S2, no m/r/g.   Gastrointestinal: +BS, soft NTND, no guarding or rebound tenderness, no palpable masses   Extremities: wwp; no cyanosis, clubbing or edema.   Vascular: Pulses equal and strong throughout.   Neurological: AAOx3, no CN deficits, strength and sensation intact throughout.   Skin: No gross skin abnormalities or rashes SUBJECTIVE:  No acute events overnight, no BRBPR or melanotic stools. denies feeling lightheaded or dizzy      PHYSICAL EXAM:  Constitutional: NAD, comfortable in bed.  HEENT: PERRLA, EOMI, no conjunctival pallor or scleral icterus, MMM  Neck: Supple, no JVD  Respiratory: CTA B/L. No w/r/r.   Cardiovascular: RRR, normal S1 and S2, no m/r/g.   Gastrointestinal: +BS, soft NTND, no guarding or rebound tenderness, no palpable masses   Extremities: wwp; no cyanosis, clubbing or edema.   Vascular: Pulses equal and strong throughout.   Neurological: AAOx3, no CN deficits, strength and sensation intact throughout.   Skin: No gross skin abnormalities or rashes

## 2022-12-25 LAB
ANION GAP SERPL CALC-SCNC: 9 MMOL/L — SIGNIFICANT CHANGE UP (ref 5–17)
APTT BLD: 59.9 SEC — HIGH (ref 27.5–35.5)
APTT BLD: 76.1 SEC — HIGH (ref 27.5–35.5)
APTT BLD: 82.2 SEC — HIGH (ref 27.5–35.5)
BUN SERPL-MCNC: 12 MG/DL — SIGNIFICANT CHANGE UP (ref 7–23)
CALCIUM SERPL-MCNC: 9.4 MG/DL — SIGNIFICANT CHANGE UP (ref 8.4–10.5)
CHLORIDE SERPL-SCNC: 104 MMOL/L — SIGNIFICANT CHANGE UP (ref 96–108)
CO2 SERPL-SCNC: 23 MMOL/L — SIGNIFICANT CHANGE UP (ref 22–31)
CREAT SERPL-MCNC: 0.87 MG/DL — SIGNIFICANT CHANGE UP (ref 0.5–1.3)
EGFR: 101 ML/MIN/1.73M2 — SIGNIFICANT CHANGE UP
GLUCOSE SERPL-MCNC: 109 MG/DL — HIGH (ref 70–99)
HCT VFR BLD CALC: 42.3 % — SIGNIFICANT CHANGE UP (ref 39–50)
HGB BLD-MCNC: 14.4 G/DL — SIGNIFICANT CHANGE UP (ref 13–17)
MCHC RBC-ENTMCNC: 28.9 PG — SIGNIFICANT CHANGE UP (ref 27–34)
MCHC RBC-ENTMCNC: 34 GM/DL — SIGNIFICANT CHANGE UP (ref 32–36)
MCV RBC AUTO: 84.8 FL — SIGNIFICANT CHANGE UP (ref 80–100)
NRBC # BLD: 0 /100 WBCS — SIGNIFICANT CHANGE UP (ref 0–0)
PLATELET # BLD AUTO: 214 K/UL — SIGNIFICANT CHANGE UP (ref 150–400)
POTASSIUM SERPL-MCNC: 4.1 MMOL/L — SIGNIFICANT CHANGE UP (ref 3.5–5.3)
POTASSIUM SERPL-SCNC: 4.1 MMOL/L — SIGNIFICANT CHANGE UP (ref 3.5–5.3)
RBC # BLD: 4.99 M/UL — SIGNIFICANT CHANGE UP (ref 4.2–5.8)
RBC # FLD: 14.1 % — SIGNIFICANT CHANGE UP (ref 10.3–14.5)
SODIUM SERPL-SCNC: 136 MMOL/L — SIGNIFICANT CHANGE UP (ref 135–145)
WBC # BLD: 4.47 K/UL — SIGNIFICANT CHANGE UP (ref 3.8–10.5)
WBC # FLD AUTO: 4.47 K/UL — SIGNIFICANT CHANGE UP (ref 3.8–10.5)

## 2022-12-25 PROCEDURE — 99232 SBSQ HOSP IP/OBS MODERATE 35: CPT

## 2022-12-25 RX ORDER — HEPARIN SODIUM 5000 [USP'U]/ML
1100 INJECTION INTRAVENOUS; SUBCUTANEOUS
Qty: 25000 | Refills: 0 | Status: DISCONTINUED | OUTPATIENT
Start: 2022-12-25 | End: 2022-12-27

## 2022-12-25 RX ORDER — HEPARIN SODIUM 5000 [USP'U]/ML
1200 INJECTION INTRAVENOUS; SUBCUTANEOUS
Qty: 25000 | Refills: 0 | Status: DISCONTINUED | OUTPATIENT
Start: 2022-12-25 | End: 2022-12-25

## 2022-12-25 RX ORDER — ASPIRIN/CALCIUM CARB/MAGNESIUM 324 MG
325 TABLET ORAL DAILY
Refills: 0 | Status: DISCONTINUED | OUTPATIENT
Start: 2022-12-26 | End: 2022-12-27

## 2022-12-25 RX ADMIN — Medication 81 MILLIGRAM(S): at 11:37

## 2022-12-25 RX ADMIN — ATORVASTATIN CALCIUM 80 MILLIGRAM(S): 80 TABLET, FILM COATED ORAL at 21:34

## 2022-12-25 RX ADMIN — POLYETHYLENE GLYCOL 3350 17 GRAM(S): 17 POWDER, FOR SOLUTION ORAL at 11:37

## 2022-12-25 RX ADMIN — HEPARIN SODIUM 12 UNIT(S)/HR: 5000 INJECTION INTRAVENOUS; SUBCUTANEOUS at 08:01

## 2022-12-25 RX ADMIN — HEPARIN SODIUM 11 UNIT(S)/HR: 5000 INJECTION INTRAVENOUS; SUBCUTANEOUS at 14:10

## 2022-12-25 NOTE — PROGRESS NOTE ADULT - ASSESSMENT
58yo M with PMH of HTN and Psx of lumbar fusion in March 2022 admitted to Neurosurgery on 12/22 (transferred from 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  S/p felx sig and EUS on 12/23 with biopsies.     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  S/p flex sig and EUS on 12/23 with biopsies.     pending CT chest/abd/pl results for staging  GI consult  Rest of care per primary team  Surgery team 1C will continue to follow, [phone]  Plans discussed with Dr. Marley

## 2022-12-25 NOTE — PROGRESS NOTE ADULT - SUBJECTIVE AND OBJECTIVE BOX
SUBJECTIVE: Pt seen and examined at bedside with chief. Pt denies any abd or rectal pain. Denies bloody BMs. Pain well controlled. Tolerating diet without N/V. Admits to      MEDICATIONS  (STANDING):  aspirin enteric coated 81 milliGRAM(s) Oral daily  atorvastatin 80 milliGRAM(s) Oral at bedtime  heparin  Infusion 1200 Unit(s)/Hr (12 mL/Hr) IV Continuous <Continuous>  polyethylene glycol 3350 17 Gram(s) Oral daily  senna 2 Tablet(s) Oral at bedtime    MEDICATIONS  (PRN):  acetaminophen     Tablet .. 650 milliGRAM(s) Oral every 6 hours PRN Temp greater or equal to 38.5C (101.3F), Moderate Pain (4 - 6)  ondansetron Injectable 4 milliGRAM(s) IV Push every 6 hours PRN Nausea and/or Vomiting      Vital Signs Last 24 Hrs  T(C): 37.1 (25 Dec 2022 09:07), Max: 37.2 (24 Dec 2022 21:25)  T(F): 98.7 (25 Dec 2022 09:07), Max: 98.9 (24 Dec 2022 21:25)  HR: 84 (25 Dec 2022 08:16) (72 - 90)  BP: 140/86 (25 Dec 2022 08:16) (122/88 - 146/90)  BP(mean): 107 (25 Dec 2022 08:16) (100 - 117)  RR: 18 (25 Dec 2022 08:16) (17 - 18)  SpO2: 97% (25 Dec 2022 08:16) (96% - 98%)    Parameters below as of 25 Dec 2022 08:16  Patient On (Oxygen Delivery Method): room air        PHYSICAL EXAM:      Constitutional: A&Ox3    Respiratory: non labored breathing, no respiratory distress    Cardiovascular: NSR, RRR    Gastrointestinal: soft ND, NT, no rebound or guarding     Genitourinary: voiding     Extremities: (-) edema                  I&O's Detail    24 Dec 2022 07:01  -  25 Dec 2022 07:00  --------------------------------------------------------  IN:    Heparin: 44 mL    Heparin: 96 mL    Heparin: 48 mL    Heparin: 91 mL    Oral Fluid: 840 mL  Total IN: 1119 mL    OUT:    Voided (mL): 1975 mL  Total OUT: 1975 mL    Total NET: -856 mL          LABS:                        14.4   4.47  )-----------( 214      ( 25 Dec 2022 06:53 )             42.3     12-25    136  |  104  |  12  ----------------------------<  109<H>  4.1   |  23  |  0.87    Ca    9.4      25 Dec 2022 06:53  Phos  3.0     12-24  Mg     2.1     12-24    TPro  7.1  /  Alb  3.8  /  TBili  0.3  /  DBili  x   /  AST  21  /  ALT  17  /  AlkPhos  96  12-24    PT/INR - ( 24 Dec 2022 06:09 )   PT: 15.6 sec;   INR: 1.31          PTT - ( 25 Dec 2022 06:53 )  PTT:82.2 sec      RADIOLOGY & ADDITIONAL STUDIES:

## 2022-12-25 NOTE — CHART NOTE - NSCHARTNOTEFT_GEN_A_CORE
Flexible Sigmoidoscopy with lower EUS performed in endoscopy suite with Dr Candelario and myself, findings as noted below:    Impression:  -Advanced gastroscope up to proximal descending colon. Upon withdrawal of the scope, noted to have luminal narrowing within the rectum with normal overlying appearing mucosa, suspected to be from compression from perirectal mass noted on imaging (right lateral position, also appreciated on JOSSIE). Gastroscope exchanged for linear echoendoscope. Approx 9 cm non-mucosal perirectal mass noted, s/p 3 passes using [employer] 22 gauge needle.  -Adequate prep for visualizing examined area, however unable to suction stool entirely 2/2 food residue, limiting full visualization of mucosa. Unable to perform retroflexion 2/2 narrowed rectum in the setting of perirectal mass.    Plan:  -Await pathology results.    -Resume Previous Diet     -CT Chest/Abdomen/Pelvis with IV contrast for staging      -No contraindication to resuming ASA 81 mg daily     -Return to floor for further management
EDU o/n neuro stable. remains on heparin gtt. Resumed aspirin 325 mg per gen surg. Pending CTCAP final results.
Plavix dc'd needs to be off for 1 week preop. echo performed, WNL. dopplers negative. CTH shows b/l R>L acute/subacute cerebellar infarcts. 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 vascular consulted, rec b/l arterial dopplers to eval tibial arteries. GI consulted, preop colonoscopy tomorrow. started on heparin gtt for stroke

## 2022-12-25 NOTE — PROGRESS NOTE ADULT - SUBJECTIVE AND OBJECTIVE BOX
HPI:  58yo M with PMHx of HTN, previous lumbar fusion (March 2022) who originally was BIBEMS to [facility] on 12/16/22 after an episode of extreme dizziness and gait instability that caused him to fall, unclear whether he hit his head or +LOC, but then describes episode of aphasia, contracture of bilateral hands and confusion. He reports that he lives in the basement of his apartment building and that his upstairs neighbor heard him fall and called an ambulance. Episode lasted approximately about 1-1.5 hours. On arrival to [place], CTH was negative, but MRI demonstrated multiple acute cerebellar ischemic infarcts. He was then started on aspirin, plavix and atorvastatin. 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 Patient was transferred to [facility] for further workup for possible complex bypass surgery. Of note, patient endorses that he had a brother who passed away from complications of multiple strokes but otherwise denies family history of bleeding/clotting disorders.     Denies previous episodes of gait instability or falls, visual changes, syncopal episodes.  (22 Dec 2022 04:32)    OVERNIGHT EVENTS:  EDU o/n neuro stable    Hospital Course:   12/22: Admitted. Plavix dc'd needs to be off for 1 week preop. echo performed, WNL. dopplers negative. CTH shows b/l R>L acute/subacute cerebellar infarcts. 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 vascular consulted, rec b/l arterial dopplers to eval tibial arteries. GI consulted, preop colonoscopy tomorrow. started on heparin gtt for stroke  12/23: EDU overnight, heparin gtt held at 2AM. pending colonoscopy today. Hypercoaguable panel ordered per stroke/neuro recs. Sigmoidoscopy performed multiple biopsies taken. Restarted Aspirin 81 mg. Arterial dopplers demonstrating left tibial artery new complete occlusion, right anterior tibial artery normal. MR Nova confirming right vertebral artery occlusion. MR brain demonstrating cerebellar infarcts no other masses or lesions seen. Stroke consulted and hypercoaguable workup sent.   12/24: EDU overnight, heparin drip restarted at midnight. Complaining of intermittent rectal pain, tylenol given. Hep gtt started. PTTs monitoring. CTCAP completed  12/25: EDU o/n neuro stable. remains on heparin gtt    Vital Signs Last 24 Hrs  T(C): 37.2 (24 Dec 2022 21:25), Max: 37.2 (24 Dec 2022 00:25)  T(F): 98.9 (24 Dec 2022 21:25), Max: 98.9 (24 Dec 2022 00:25)  HR: 78 (24 Dec 2022 23:40) (74 - 90)  BP: 129/81 (24 Dec 2022 23:40) (122/88 - 146/90)  BP(mean): 100 (24 Dec 2022 23:40) (100 - 117)  RR: 18 (24 Dec 2022 23:40) (17 - 18)  SpO2: 96% (24 Dec 2022 23:40) (96% - 98%)    Parameters below as of 24 Dec 2022 23:40  Patient On (Oxygen Delivery Method): room air        I&O's Summary    23 Dec 2022 07:01  -  24 Dec 2022 07:00  --------------------------------------------------------  IN: 792 mL / OUT: 350 mL / NET: 442 mL    24 Dec 2022 07:01  -  25 Dec 2022 00:10  --------------------------------------------------------  IN: 1041 mL / OUT: 700 mL / NET: 341 mL        PHYSICAL EXAM:  GEN: laying in bed, appears well, NAD  NEURO: AOx3. FC, OE spont, speech intact, face symmetric. CNII-XII intact. PERRL, EOMI. No pronator drift. MAEx4. 5/5 strength throughout. SILT. mild RUE dysmetria  CV: RRR +S1/S2  PULM: CTAB  GI: Abd soft, NT/ND  EXT: ext warm, dry, nontender    TUBES/LINES:  [] Sarah  [] Lumbar Drain  [] Wound Drains  [] Others      DIET:  [] NPO  [x] Mechanical  [] Tube feeds    LABS:                        13.4   5.26  )-----------( 194      ( 24 Dec 2022 06:09 )             39.4     12-24    137  |  102  |  9   ----------------------------<  110<H>  3.8   |  23  |  0.90    Ca    9.0      24 Dec 2022 06:09  Phos  3.0     12-24  Mg     2.1     12-24    TPro  7.1  /  Alb  3.8  /  TBili  0.3  /  DBili  x   /  AST  21  /  ALT  17  /  AlkPhos  96  12-24    PT/INR - ( 24 Dec 2022 06:09 )   PT: 15.6 sec;   INR: 1.31          PTT - ( 24 Dec 2022 22:49 )  PTT:36.8 sec        CAPILLARY BLOOD GLUCOSE          Drug Levels: [] N/A    CSF Analysis: [] N/A      Allergies    No Known Allergies    Intolerances      MEDICATIONS:  Antibiotics:    Neuro:  acetaminophen     Tablet .. 650 milliGRAM(s) Oral every 6 hours PRN  ondansetron Injectable 4 milliGRAM(s) IV Push every 6 hours PRN    Anticoagulation:  aspirin enteric coated 81 milliGRAM(s) Oral daily  heparin  Infusion 1300 Unit(s)/Hr IV Continuous <Continuous>    OTHER:  atorvastatin 80 milliGRAM(s) Oral at bedtime  polyethylene glycol 3350 17 Gram(s) Oral daily  senna 2 Tablet(s) Oral at bedtime    IVF:    CULTURES:    RADIOLOGY & ADDITIONAL TESTS:      ASSESSMENT:  58yo M with PMHx of HTN, previous lumbar fusion (March 2022) who originally was BIBEMS to [facility] on 12/16/22 after an episode of extreme dizziness and gait instability who had a cerebral angiogram on 12/19/22 that demonstrated 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 Patient was transferred to [facility] for further workup for possible complex bypass surgery.     STROKE    No pertinent family history in first degree relatives    Family history stroke in brother (Sibling)    Handoff    MEWS Score    No pertinent past medical history    HTN (hypertension)    Cerebellar infarct    Vertebral artery occlusion, right    HTN (hypertension)    Rectal mass    Prophylactic measure    No significant past surgical history    History of lumbar fusion    SysAdmin_VstLnk        PLAN:  NEURO  - Neuro/vitals q4 hours  - ASA 81, resume  when cleared w/ GI  - Hold plavix 7 days in setting of possible surgery.   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 LE duplex veins neg    ID  - Afebrile    Dispo: PT/OT pending, dispo pending OR  Assessment and plan discussed with Dr. Jarvis       Assessment:  Present when checked    []  GCS  E   V  M     Heart Failure: []Acute, [] acute on chronic , []chronic  Heart Failure:  [] Diastolic (HFpEF), [] Systolic (HFrEF), []Combined (HFpEF and HFrEF), [] RHF, [] Pulm HTN, [] Other    [] CARRILLO, [] ATN, [] AIN, [] other  [] CKD1, [] CKD2, [] CKD 3, [] CKD 4, [] CKD 5, []ESRD    Encephalopathy: [] Metabolic, [] Hepatic, [] toxic, [] Neurological, [] Other    Abnormal Nurtitional Status: [] malnurtition (see nutrition note), [ ]underweight: BMI < 19, [] morbid obesity: BMI >40, [] Cachexia    [] Sepsis  [] hypovolemic shock,[] cardiogenic shock, [] hemorrhagic shock, [] neuogenic shock  [] Acute Respiratory Failure  []Cerebral edema, [] Brain compression/ herniation,   [] Functional quadriplegia  [] Acute blood loss anemia

## 2022-12-26 LAB
ANION GAP SERPL CALC-SCNC: 10 MMOL/L — SIGNIFICANT CHANGE UP (ref 5–17)
APTT BLD: 59.7 SEC — HIGH (ref 27.5–35.5)
APTT BLD: 60.5 SEC — HIGH (ref 27.5–35.5)
BLD GP AB SCN SERPL QL: NEGATIVE — SIGNIFICANT CHANGE UP
BUN SERPL-MCNC: 14 MG/DL — SIGNIFICANT CHANGE UP (ref 7–23)
CALCIUM SERPL-MCNC: 9.2 MG/DL — SIGNIFICANT CHANGE UP (ref 8.4–10.5)
CHLORIDE SERPL-SCNC: 103 MMOL/L — SIGNIFICANT CHANGE UP (ref 96–108)
CO2 SERPL-SCNC: 24 MMOL/L — SIGNIFICANT CHANGE UP (ref 22–31)
CREAT SERPL-MCNC: 0.91 MG/DL — SIGNIFICANT CHANGE UP (ref 0.5–1.3)
EGFR: 99 ML/MIN/1.73M2 — SIGNIFICANT CHANGE UP
GLUCOSE SERPL-MCNC: 116 MG/DL — HIGH (ref 70–99)
HCT VFR BLD CALC: 41.7 % — SIGNIFICANT CHANGE UP (ref 39–50)
HGB BLD-MCNC: 14.2 G/DL — SIGNIFICANT CHANGE UP (ref 13–17)
MAGNESIUM SERPL-MCNC: 2.1 MG/DL — SIGNIFICANT CHANGE UP (ref 1.6–2.6)
MCHC RBC-ENTMCNC: 29 PG — SIGNIFICANT CHANGE UP (ref 27–34)
MCHC RBC-ENTMCNC: 34.1 GM/DL — SIGNIFICANT CHANGE UP (ref 32–36)
MCV RBC AUTO: 85.3 FL — SIGNIFICANT CHANGE UP (ref 80–100)
NRBC # BLD: 0 /100 WBCS — SIGNIFICANT CHANGE UP (ref 0–0)
PHOSPHATE SERPL-MCNC: 3.5 MG/DL — SIGNIFICANT CHANGE UP (ref 2.5–4.5)
PLATELET # BLD AUTO: 224 K/UL — SIGNIFICANT CHANGE UP (ref 150–400)
POTASSIUM SERPL-MCNC: 3.7 MMOL/L — SIGNIFICANT CHANGE UP (ref 3.5–5.3)
POTASSIUM SERPL-SCNC: 3.7 MMOL/L — SIGNIFICANT CHANGE UP (ref 3.5–5.3)
RBC # BLD: 4.89 M/UL — SIGNIFICANT CHANGE UP (ref 4.2–5.8)
RBC # FLD: 14.1 % — SIGNIFICANT CHANGE UP (ref 10.3–14.5)
RH IG SCN BLD-IMP: POSITIVE — SIGNIFICANT CHANGE UP
SARS-COV-2 RNA SPEC QL NAA+PROBE: SIGNIFICANT CHANGE UP
SODIUM SERPL-SCNC: 137 MMOL/L — SIGNIFICANT CHANGE UP (ref 135–145)
WBC # BLD: 4.94 K/UL — SIGNIFICANT CHANGE UP (ref 3.8–10.5)
WBC # FLD AUTO: 4.94 K/UL — SIGNIFICANT CHANGE UP (ref 3.8–10.5)

## 2022-12-26 PROCEDURE — 99232 SBSQ HOSP IP/OBS MODERATE 35: CPT

## 2022-12-26 PROCEDURE — 99232 SBSQ HOSP IP/OBS MODERATE 35: CPT | Mod: GC

## 2022-12-26 RX ORDER — CHLORHEXIDINE GLUCONATE 213 G/1000ML
1 SOLUTION TOPICAL EVERY 12 HOURS
Refills: 0 | Status: COMPLETED | OUTPATIENT
Start: 2022-12-26 | End: 2022-12-27

## 2022-12-26 RX ORDER — SODIUM CHLORIDE 9 MG/ML
1000 INJECTION INTRAMUSCULAR; INTRAVENOUS; SUBCUTANEOUS
Refills: 0 | Status: DISCONTINUED | OUTPATIENT
Start: 2022-12-26 | End: 2022-12-27

## 2022-12-26 RX ORDER — POTASSIUM CHLORIDE 20 MEQ
40 PACKET (EA) ORAL ONCE
Refills: 0 | Status: COMPLETED | OUTPATIENT
Start: 2022-12-26 | End: 2022-12-26

## 2022-12-26 RX ADMIN — HEPARIN SODIUM 11 UNIT(S)/HR: 5000 INJECTION INTRAVENOUS; SUBCUTANEOUS at 13:45

## 2022-12-26 RX ADMIN — POLYETHYLENE GLYCOL 3350 17 GRAM(S): 17 POWDER, FOR SOLUTION ORAL at 12:29

## 2022-12-26 RX ADMIN — Medication 325 MILLIGRAM(S): at 13:11

## 2022-12-26 RX ADMIN — CHLORHEXIDINE GLUCONATE 1 APPLICATION(S): 213 SOLUTION TOPICAL at 22:29

## 2022-12-26 RX ADMIN — ATORVASTATIN CALCIUM 80 MILLIGRAM(S): 80 TABLET, FILM COATED ORAL at 22:29

## 2022-12-26 RX ADMIN — HEPARIN SODIUM 11 UNIT(S)/HR: 5000 INJECTION INTRAVENOUS; SUBCUTANEOUS at 23:53

## 2022-12-26 RX ADMIN — Medication 40 MILLIEQUIVALENT(S): at 15:43

## 2022-12-26 NOTE — PROGRESS NOTE ADULT - ASSESSMENT
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 Patient was transferred to [facility] for further workup for possible complex bypass surgery.  Hospital course complicated by finding of new rectal mass, s/p flex sig on 12/24 with visualization limited due to perirectal narrowing and mass.

## 2022-12-26 NOTE — PROGRESS NOTE ADULT - SUBJECTIVE AND OBJECTIVE BOX
SUBJECTIVE:  No acute events overnight, no BRBPR or melanotic stools. denies feeling lightheaded or dizzy      PHYSICAL EXAM:  Constitutional: NAD, comfortable in bed.  HEENT: PERRLA, EOMI, no conjunctival pallor or scleral icterus, MMM  Neck: Supple, no JVD  Respiratory: CTA B/L. No w/r/r.   Cardiovascular: RRR, normal S1 and S2, no m/r/g.   Gastrointestinal: +BS, soft NTND, no guarding or rebound tenderness, no palpable masses   Extremities: wwp; no cyanosis, clubbing or edema.   Vascular: Pulses equal and strong throughout.   Neurological: AAOx3, no CN deficits, strength and sensation intact throughout.   Skin: No gross skin abnormalities or rashes

## 2022-12-26 NOTE — PROGRESS NOTE ADULT - ASSESSMENT
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  S/p flex sig and EUS on 12/23 with biopsies.     pending CT chest/abd/pl results for staging  GI consult  Rest of care per primary team  Surgery team 1C will continue to follow, [phone]  Plans discussed with Dr. Marley

## 2022-12-26 NOTE — PROGRESS NOTE ADULT - SUBJECTIVE AND OBJECTIVE BOX
SUBJECTIVE: no acute events. CT CAP pending final read.     Vital Signs Last 24 Hrs  T(C): 36.6 (26 Dec 2022 09:07), Max: 37.1 (25 Dec 2022 14:00)  T(F): 97.9 (26 Dec 2022 09:07), Max: 98.7 (25 Dec 2022 14:00)  HR: 88 (26 Dec 2022 08:22) (70 - 88)  BP: 141/94 (26 Dec 2022 08:22) (126/90 - 141/94)  BP(mean): 111 (26 Dec 2022 08:22) (104 - 111)  RR: 18 (26 Dec 2022 08:22) (18 - 18)  SpO2: 100% (26 Dec 2022 08:22) (96% - 100%)    Parameters below as of 26 Dec 2022 08:22  Patient On (Oxygen Delivery Method): room air        PHYSICAL EXAM:   Gen: Awake, alert, NAD, resting comfortably   CV: RRR  Pulm: no respiratory distress  Abd: soft, ND, NTTP, no rebound or guarding   Ext: WWP    I&O's Detail    25 Dec 2022 07:01  -  26 Dec 2022 07:00  --------------------------------------------------------  IN:    Heparin: 60 mL    Heparin: 187 mL    Oral Fluid: 1680 mL  Total IN: 1927 mL    OUT:    Voided (mL): 1350 mL  Total OUT: 1350 mL    Total NET: 577 mL      26 Dec 2022 07:01  -  26 Dec 2022 10:58  --------------------------------------------------------  IN:    Heparin: 11 mL  Total IN: 11 mL    OUT:    Voided (mL): 0 mL  Total OUT: 0 mL    Total NET: 11 mL          LABS:                        14.2   4.94  )-----------( 224      ( 26 Dec 2022 05:03 )             41.7     12-26    137  |  103  |  14  ----------------------------<  116<H>  3.7   |  24  |  0.91    Ca    9.2      26 Dec 2022 05:03  Phos  3.5     12-26  Mg     2.1     12-26        PTT - ( 26 Dec 2022 05:03 )  PTT:60.5 sec  CAPILLARY BLOOD GLUCOSE          RADIOLOGY & ADDITIONAL STUDIES:

## 2022-12-26 NOTE — PROGRESS NOTE ADULT - PROBLEM SELECTOR PLAN 4
New rectal mass seen on CT LE angio  - flex sig with lower EUS with bx 12/23 -- 9cm john-rectal mass, unable to visualize mucosa due to rectal narrowing and perirectal mass  - f/u flex sig biopsy.  - Colorectal surgery following  - Tumor markers not elevated  - CT C/A/P to eval for extent of disease  - labs noted, CBC stable

## 2022-12-26 NOTE — PROGRESS NOTE ADULT - PROBLEM SELECTOR PLAN 1
Possible bypass per NSG  - Vascular consulted, reccs appreciated --  for evaluation of anterior tibial artery for use as a graft for a cerebral bypass. 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

## 2022-12-26 NOTE — PROGRESS NOTE ADULT - SUBJECTIVE AND OBJECTIVE BOX
HPI:  58yo M with PMHx of HTN, previous lumbar fusion (March 2022) who originally was BIBEMS to [facility] on 12/16/22 after an episode of extreme dizziness and gait instability that caused him to fall, unclear whether he hit his head or +LOC, but then describes episode of aphasia, contracture of bilateral hands and confusion. He reports that he lives in the basement of his apartment building and that his upstairs neighbor heard him fall and called an ambulance. Episode lasted approximately about 1-1.5 hours. On arrival to [place], CTH was negative, but MRI demonstrated multiple acute cerebellar ischemic infarcts. He was then started on aspirin, plavix and atorvastatin. 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 Patient was transferred to [facility] for further workup for possible complex bypass surgery. Of note, patient endorses that he had a brother who passed away from complications of multiple strokes but otherwise denies family history of bleeding/clotting disorders.     Denies previous episodes of gait instability or falls, visual changes, syncopal episodes.  (22 Dec 2022 04:32)    OVERNIGHT EVENTS: EDU o/n neuro stable    Hospital Course:   12/22: Admitted. Plavix dc'd needs to be off for 1 week preop. echo performed, WNL. dopplers negative. CTH shows b/l R>L acute/subacute cerebellar infarcts. 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 vascular consulted, rec b/l arterial dopplers to eval tibial arteries. GI consulted, preop colonoscopy tomorrow. started on heparin gtt for stroke  12/23: EDU overnight, heparin gtt held at 2AM. pending colonoscopy today. Hypercoaguable panel ordered per stroke/neuro recs. Sigmoidoscopy performed multiple biopsies taken. Restarted Aspirin 81 mg. Arterial dopplers demonstrating left tibial artery new complete occlusion, right anterior tibial artery normal. MR Nova confirming right vertebral artery occlusion. MR brain demonstrating cerebellar infarcts no other masses or lesions seen. Stroke consulted and hypercoaguable workup sent.   12/24: EDU overnight, heparin drip restarted at midnight. Complaining of intermittent rectal pain, tylenol given. Hep gtt started. PTTs monitoring. CTCAP completed  12/25: EDU o/n neuro stable. remains on heparin gtt. Resumed aspirin 325 mg per gen surg. Pending CTCAP final results.    12/26: EDU o/n neuro stable. remains on heparin gtt    Vital Signs Last 24 Hrs  T(C): 37 (25 Dec 2022 22:04), Max: 37.1 (25 Dec 2022 09:07)  T(F): 98.6 (25 Dec 2022 22:04), Max: 98.7 (25 Dec 2022 09:07)  HR: 80 (26 Dec 2022 00:08) (72 - 84)  BP: 135/96 (26 Dec 2022 00:08) (126/90 - 140/86)  BP(mean): 109 (26 Dec 2022 00:08) (104 - 109)  RR: 18 (26 Dec 2022 00:08) (18 - 18)  SpO2: 96% (26 Dec 2022 00:08) (96% - 98%)    Parameters below as of 26 Dec 2022 00:08  Patient On (Oxygen Delivery Method): room air        I&O's Summary    24 Dec 2022 07:01  -  25 Dec 2022 07:00  --------------------------------------------------------  IN: 1119 mL / OUT: 1975 mL / NET: -856 mL    25 Dec 2022 07:01  -  26 Dec 2022 01:35  --------------------------------------------------------  IN: 1577 mL / OUT: 900 mL / NET: 677 mL        PHYSICAL EXAM:  GEN: laying in bed, appears well, NAD  NEURO: AOx3. FC, OE spont, speech intact, face symmetric. CNII-XII intact. PERRL, EOMI. No pronator drift. MAEx4. 5/5 strength throughout. SILT  CV: RRR +S1/S2  PULM: CTAB  GI: Abd soft, NT/ND  EXT: ext warm, dry, nontender    TUBES/LINES:  [] Sarah  [] Lumbar Drain  [] Wound Drains  [] Others      DIET:  [] NPO  [x] Mechanical  [] Tube feeds    LABS:                        14.4   4.47  )-----------( 214      ( 25 Dec 2022 06:53 )             42.3     12-25    136  |  104  |  12  ----------------------------<  109<H>  4.1   |  23  |  0.87    Ca    9.4      25 Dec 2022 06:53  Phos  3.0     12-24  Mg     2.1     12-24    TPro  7.1  /  Alb  3.8  /  TBili  0.3  /  DBili  x   /  AST  21  /  ALT  17  /  AlkPhos  96  12-24    PT/INR - ( 24 Dec 2022 06:09 )   PT: 15.6 sec;   INR: 1.31          PTT - ( 25 Dec 2022 23:36 )  PTT:59.7 sec        CAPILLARY BLOOD GLUCOSE          Drug Levels: [] N/A    CSF Analysis: [] N/A      Allergies    No Known Allergies    Intolerances      MEDICATIONS:  Antibiotics:    Neuro:  acetaminophen     Tablet .. 650 milliGRAM(s) Oral every 6 hours PRN  ondansetron Injectable 4 milliGRAM(s) IV Push every 6 hours PRN    Anticoagulation:  aspirin enteric coated 325 milliGRAM(s) Oral daily  heparin  Infusion 1100 Unit(s)/Hr IV Continuous <Continuous>    OTHER:  atorvastatin 80 milliGRAM(s) Oral at bedtime  polyethylene glycol 3350 17 Gram(s) Oral daily  senna 2 Tablet(s) Oral at bedtime    IVF:    CULTURES:    RADIOLOGY & ADDITIONAL TESTS:      ASSESSMENT:  58yo M with PMHx of HTN, previous lumbar fusion (March 2022) who originally was BIBEMS to [facility] on 12/16/22 after an episode of extreme dizziness and gait instability who had a cerebral angiogram on 12/19/22 that demonstrated 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 Patient was transferred to [facility] for further workup for possible complex bypass surgery.     STROKE    No pertinent family history in first degree relatives    Family history stroke in brother (Sibling)    Handoff    MEWS Score    No pertinent past medical history    HTN (hypertension)    Cerebellar infarct    Vertebral artery occlusion, right    HTN (hypertension)    Rectal mass    Prophylactic measure    No significant past surgical history    History of lumbar fusion    SysAdmin_VstLnk        PLAN:  NEURO  - Neuro/vitals q4 hours  -   - Hold plavix 7 days in setting of possible surgery.   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 LE duplex veins neg    ID  - Afebrile    Dispo: PT/OT pending, dispo pending OR  Assessment and plan discussed with Dr. Jarvis       Assessment:  Present when checked    []  GCS  E   V  M     Heart Failure: []Acute, [] acute on chronic , []chronic  Heart Failure:  [] Diastolic (HFpEF), [] Systolic (HFrEF), []Combined (HFpEF and HFrEF), [] RHF, [] Pulm HTN, [] Other    [] CARRILLO, [] ATN, [] AIN, [] other  [] CKD1, [] CKD2, [] CKD 3, [] CKD 4, [] CKD 5, []ESRD    Encephalopathy: [] Metabolic, [] Hepatic, [] toxic, [] Neurological, [] Other    Abnormal Nurtitional Status: [] malnurtition (see nutrition note), [ ]underweight: BMI < 19, [] morbid obesity: BMI >40, [] Cachexia    [] Sepsis  [] hypovolemic shock,[] cardiogenic shock, [] hemorrhagic shock, [] neuogenic shock  [] Acute Respiratory Failure  []Cerebral edema, [] Brain compression/ herniation,   [] Functional quadriplegia  [] Acute blood loss anemia

## 2022-12-27 ENCOUNTER — TRANSCRIPTION ENCOUNTER (OUTPATIENT)
Age: 57
End: 2022-12-27

## 2022-12-27 VITALS — TEMPERATURE: 98 F

## 2022-12-27 PROBLEM — I10 ESSENTIAL (PRIMARY) HYPERTENSION: Chronic | Status: ACTIVE | Noted: 2022-12-22

## 2022-12-27 LAB
ANION GAP SERPL CALC-SCNC: 9 MMOL/L — SIGNIFICANT CHANGE UP (ref 5–17)
APTT BLD: 48.5 SEC — HIGH (ref 27.5–35.5)
AT III ACT/NOR PPP CHRO: 81 % — LOW (ref 85–135)
AT III AG PPP IA-MCNC: 23 MG/DL — SIGNIFICANT CHANGE UP (ref 19–31)
BLD GP AB SCN SERPL QL: NEGATIVE — SIGNIFICANT CHANGE UP
BUN SERPL-MCNC: 11 MG/DL — SIGNIFICANT CHANGE UP (ref 7–23)
CALCIUM SERPL-MCNC: 9 MG/DL — SIGNIFICANT CHANGE UP (ref 8.4–10.5)
CARDIOLIPIN AB SER-ACNC: NEGATIVE — SIGNIFICANT CHANGE UP
CHLORIDE SERPL-SCNC: 106 MMOL/L — SIGNIFICANT CHANGE UP (ref 96–108)
CO2 SERPL-SCNC: 22 MMOL/L — SIGNIFICANT CHANGE UP (ref 22–31)
CREAT SERPL-MCNC: 0.91 MG/DL — SIGNIFICANT CHANGE UP (ref 0.5–1.3)
EGFR: 99 ML/MIN/1.73M2 — SIGNIFICANT CHANGE UP
GLUCOSE SERPL-MCNC: 101 MG/DL — HIGH (ref 70–99)
HCT VFR BLD CALC: 41.3 % — SIGNIFICANT CHANGE UP (ref 39–50)
HGB BLD-MCNC: 13.8 G/DL — SIGNIFICANT CHANGE UP (ref 13–17)
INR BLD: 1.09 — SIGNIFICANT CHANGE UP (ref 0.88–1.16)
MAGNESIUM SERPL-MCNC: 2.1 MG/DL — SIGNIFICANT CHANGE UP (ref 1.6–2.6)
MCHC RBC-ENTMCNC: 28.5 PG — SIGNIFICANT CHANGE UP (ref 27–34)
MCHC RBC-ENTMCNC: 33.4 GM/DL — SIGNIFICANT CHANGE UP (ref 32–36)
MCV RBC AUTO: 85.3 FL — SIGNIFICANT CHANGE UP (ref 80–100)
NRBC # BLD: 0 /100 WBCS — SIGNIFICANT CHANGE UP (ref 0–0)
PHOSPHATE SERPL-MCNC: 3 MG/DL — SIGNIFICANT CHANGE UP (ref 2.5–4.5)
PLATELET # BLD AUTO: 193 K/UL — SIGNIFICANT CHANGE UP (ref 150–400)
POTASSIUM SERPL-MCNC: 4.2 MMOL/L — SIGNIFICANT CHANGE UP (ref 3.5–5.3)
POTASSIUM SERPL-SCNC: 4.2 MMOL/L — SIGNIFICANT CHANGE UP (ref 3.5–5.3)
PROT C ACT/NOR PPP: 105 % — SIGNIFICANT CHANGE UP (ref 74–150)
PROT S FREE PPP-ACNC: 54 % — LOW (ref 63–140)
PROTHROM AB SERPL-ACNC: 13 SEC — SIGNIFICANT CHANGE UP (ref 10.5–13.4)
RBC # BLD: 4.84 M/UL — SIGNIFICANT CHANGE UP (ref 4.2–5.8)
RBC # FLD: 14.2 % — SIGNIFICANT CHANGE UP (ref 10.3–14.5)
RH IG SCN BLD-IMP: POSITIVE — SIGNIFICANT CHANGE UP
SODIUM SERPL-SCNC: 137 MMOL/L — SIGNIFICANT CHANGE UP (ref 135–145)
WBC # BLD: 4.27 K/UL — SIGNIFICANT CHANGE UP (ref 3.8–10.5)
WBC # FLD AUTO: 4.27 K/UL — SIGNIFICANT CHANGE UP (ref 3.8–10.5)

## 2022-12-27 PROCEDURE — 36415 COLL VENOUS BLD VENIPUNCTURE: CPT

## 2022-12-27 PROCEDURE — 86147 CARDIOLIPIN ANTIBODY EA IG: CPT

## 2022-12-27 PROCEDURE — 85598 HEXAGNAL PHOSPH PLTLT NEUTRL: CPT

## 2022-12-27 PROCEDURE — 85303 CLOT INHIBIT PROT C ACTIVITY: CPT

## 2022-12-27 PROCEDURE — 86900 BLOOD TYPING SEROLOGIC ABO: CPT

## 2022-12-27 PROCEDURE — 86901 BLOOD TYPING SEROLOGIC RH(D): CPT

## 2022-12-27 PROCEDURE — U0003: CPT

## 2022-12-27 PROCEDURE — 85306 CLOT INHIBIT PROT S FREE: CPT

## 2022-12-27 PROCEDURE — 80061 LIPID PANEL: CPT

## 2022-12-27 PROCEDURE — 80048 BASIC METABOLIC PNL TOTAL CA: CPT

## 2022-12-27 PROCEDURE — 88173 CYTOPATH EVAL FNA REPORT: CPT

## 2022-12-27 PROCEDURE — 85613 RUSSELL VIPER VENOM DILUTED: CPT

## 2022-12-27 PROCEDURE — 80053 COMPREHEN METABOLIC PANEL: CPT

## 2022-12-27 PROCEDURE — 70544 MR ANGIOGRAPHY HEAD W/O DYE: CPT

## 2022-12-27 PROCEDURE — 83735 ASSAY OF MAGNESIUM: CPT

## 2022-12-27 PROCEDURE — U0005: CPT

## 2022-12-27 PROCEDURE — 70553 MRI BRAIN STEM W/O & W/DYE: CPT

## 2022-12-27 PROCEDURE — 82378 CARCINOEMBRYONIC ANTIGEN: CPT

## 2022-12-27 PROCEDURE — 92523 SPEECH SOUND LANG COMPREHEN: CPT

## 2022-12-27 PROCEDURE — 93970 EXTREMITY STUDY: CPT

## 2022-12-27 PROCEDURE — 85027 COMPLETE CBC AUTOMATED: CPT

## 2022-12-27 PROCEDURE — 74177 CT ABD & PELVIS W/CONTRAST: CPT

## 2022-12-27 PROCEDURE — 99233 SBSQ HOSP IP/OBS HIGH 50: CPT

## 2022-12-27 PROCEDURE — 85307 ASSAY ACTIVATED PROTEIN C: CPT

## 2022-12-27 PROCEDURE — 86146 BETA-2 GLYCOPROTEIN ANTIBODY: CPT

## 2022-12-27 PROCEDURE — 99232 SBSQ HOSP IP/OBS MODERATE 35: CPT | Mod: GC

## 2022-12-27 PROCEDURE — A9585: CPT

## 2022-12-27 PROCEDURE — 88305 TISSUE EXAM BY PATHOLOGIST: CPT

## 2022-12-27 PROCEDURE — 87635 SARS-COV-2 COVID-19 AMP PRB: CPT

## 2022-12-27 PROCEDURE — 84100 ASSAY OF PHOSPHORUS: CPT

## 2022-12-27 PROCEDURE — 97165 OT EVAL LOW COMPLEX 30 MIN: CPT

## 2022-12-27 PROCEDURE — 86301 IMMUNOASSAY TUMOR CA 19-9: CPT

## 2022-12-27 PROCEDURE — 85730 THROMBOPLASTIN TIME PARTIAL: CPT

## 2022-12-27 PROCEDURE — 71260 CT THORAX DX C+: CPT

## 2022-12-27 PROCEDURE — 73706 CT ANGIO LWR EXTR W/O&W/DYE: CPT

## 2022-12-27 PROCEDURE — 88341 IMHCHEM/IMCYTCHM EA ADD ANTB: CPT

## 2022-12-27 PROCEDURE — 83090 ASSAY OF HOMOCYSTEINE: CPT

## 2022-12-27 PROCEDURE — 70450 CT HEAD/BRAIN W/O DYE: CPT

## 2022-12-27 PROCEDURE — 85610 PROTHROMBIN TIME: CPT

## 2022-12-27 PROCEDURE — 97161 PT EVAL LOW COMPLEX 20 MIN: CPT

## 2022-12-27 PROCEDURE — 93307 TTE W/O DOPPLER COMPLETE: CPT

## 2022-12-27 PROCEDURE — 85301 ANTITHROMBIN III ANTIGEN: CPT

## 2022-12-27 PROCEDURE — 86850 RBC ANTIBODY SCREEN: CPT

## 2022-12-27 PROCEDURE — 85300 ANTITHROMBIN III ACTIVITY: CPT

## 2022-12-27 PROCEDURE — 93925 LOWER EXTREMITY STUDY: CPT

## 2022-12-27 PROCEDURE — 83036 HEMOGLOBIN GLYCOSYLATED A1C: CPT

## 2022-12-27 RX ORDER — ACETAMINOPHEN 500 MG
2 TABLET ORAL
Qty: 0 | Refills: 0 | DISCHARGE
Start: 2022-12-27

## 2022-12-27 RX ORDER — ASPIRIN/CALCIUM CARB/MAGNESIUM 324 MG
1 TABLET ORAL
Qty: 30 | Refills: 0
Start: 2022-12-27 | End: 2023-01-25

## 2022-12-27 RX ORDER — ATORVASTATIN CALCIUM 80 MG/1
1 TABLET, FILM COATED ORAL
Qty: 30 | Refills: 0
Start: 2022-12-27 | End: 2023-01-25

## 2022-12-27 RX ORDER — TICAGRELOR 90 MG/1
90 TABLET ORAL
Refills: 0 | Status: DISCONTINUED | OUTPATIENT
Start: 2022-12-27 | End: 2022-12-27

## 2022-12-27 RX ORDER — TICAGRELOR 90 MG/1
1 TABLET ORAL
Qty: 60 | Refills: 0
Start: 2022-12-27 | End: 2023-01-25

## 2022-12-27 RX ORDER — HEPARIN SODIUM 5000 [USP'U]/ML
1200 INJECTION INTRAVENOUS; SUBCUTANEOUS
Qty: 25000 | Refills: 0 | Status: DISCONTINUED | OUTPATIENT
Start: 2022-12-27 | End: 2022-12-27

## 2022-12-27 RX ORDER — ASPIRIN/CALCIUM CARB/MAGNESIUM 324 MG
81 TABLET ORAL DAILY
Refills: 0 | Status: DISCONTINUED | OUTPATIENT
Start: 2022-12-28 | End: 2022-12-27

## 2022-12-27 RX ADMIN — HEPARIN SODIUM 12 UNIT(S)/HR: 5000 INJECTION INTRAVENOUS; SUBCUTANEOUS at 08:25

## 2022-12-27 RX ADMIN — SODIUM CHLORIDE 100 MILLILITER(S): 9 INJECTION INTRAMUSCULAR; INTRAVENOUS; SUBCUTANEOUS at 00:00

## 2022-12-27 RX ADMIN — Medication 325 MILLIGRAM(S): at 11:15

## 2022-12-27 RX ADMIN — CHLORHEXIDINE GLUCONATE 1 APPLICATION(S): 213 SOLUTION TOPICAL at 06:44

## 2022-12-27 NOTE — DISCHARGE NOTE PROVIDER - PROVIDER TOKENS
PROVIDER:[TOKEN:[[id number]],ESTABLISHEDPATIENT:[T]],PROVIDER:[TOKEN:[[id number]],ESTABLISHEDPATIENT:[T]],PROVIDER:[TOKEN:[[id number]],ESTABLISHEDPATIENT:[T]],PROVIDER:[TOKEN:[[id number]],ESTABLISHEDPATIENT:[T]],PROVIDER:[TOKEN:[[id number]],ESTABLISHEDPATIENT:[T]]

## 2022-12-27 NOTE — PROGRESS NOTE ADULT - SUBJECTIVE AND OBJECTIVE BOX
Neurology Stroke Progress Note    INTERVAL HPI/OVERNIGHT EVENTS:  Patient seen and examined. EDU O/N. Sitting up in bed, eating breakfast. Continues to be on hep gtt and ASA increased back to 325 s/p colonoscopy.     MEDICATIONS  (STANDING):  aspirin enteric coated 325 milliGRAM(s) Oral daily  atorvastatin 80 milliGRAM(s) Oral at bedtime  heparin  Infusion 1200 Unit(s)/Hr (12 mL/Hr) IV Continuous <Continuous>  polyethylene glycol 3350 17 Gram(s) Oral daily  senna 2 Tablet(s) Oral at bedtime  sodium chloride 0.9%. 1000 milliLiter(s) (100 mL/Hr) IV Continuous <Continuous>    MEDICATIONS  (PRN):  acetaminophen     Tablet .. 650 milliGRAM(s) Oral every 6 hours PRN Temp greater or equal to 38.5C (101.3F), Moderate Pain (4 - 6)  ondansetron Injectable 4 milliGRAM(s) IV Push every 6 hours PRN Nausea and/or Vomiting      Allergies    No Known Allergies    Intolerances        Vital Signs Last 24 Hrs  T(C): 36.9 (27 Dec 2022 04:44), Max: 37.2 (26 Dec 2022 16:14)  T(F): 98.5 (27 Dec 2022 04:44), Max: 98.9 (26 Dec 2022 16:14)  HR: 68 (27 Dec 2022 08:22) (68 - 88)  BP: 139/90 (27 Dec 2022 08:22) (115/62 - 153/97)  BP(mean): 108 (27 Dec 2022 08:22) (82 - 119)  RR: 18 (27 Dec 2022 08:22) (17 - 18)  SpO2: 98% (27 Dec 2022 08:22) (95% - 100%)    Parameters below as of 27 Dec 2022 08:22  Patient On (Oxygen Delivery Method): room air        Physical exam:  General: No acute distress, awake and alert  Eyes: Anicteric sclerae, moist conjunctivae, see below for CNs  Extremities: No edema    Neurologic:  -Mental status: Awake, alert, oriented to person, place, and time. Speech is fluent with intact naming, repetition, and comprehension, no dysarthria. Follows commands. Attention/concentration intact. Fund of knowledge appropriate.  -Cranial nerves:   II: Visual fields are full to confrontation.  III, IV, VI: Extraocular movements are intact without nystagmus. Pupils equally round and reactive to light.  V:  Facial sensation V1-V3 equal and intact   VII: Face is symmetric with normal eye closure and smile  IX, X: Uvula is midline   XII: Tongue protrudes midline  Motor: Normal bulk and tone. No pronator drift. Strength bilateral upper extremity 5/5, bilateral lower extremities 5/5.  Rapid alternating movements intact and symmetric  Sensation: Intact to light touch bilaterally. No neglect or extinction on double simultaneous testing.  Coordination: trace RUE dysmetria on finger-to-nose. trace RLE dysmetria with heel-to-shin. No dysmetria in LUE or LLE.  Gait: Deferred    LABS:                        13.8   4.27  )-----------( 193      ( 27 Dec 2022 05:30 )             41.3     12-27    137  |  106  |  11  ----------------------------<  101<H>  4.2   |  22  |  0.91    Ca    9.0      27 Dec 2022 05:30  Phos  3.0     12-27  Mg     2.1     12-27      PT/INR - ( 27 Dec 2022 05:30 )   PT: 13.0 sec;   INR: 1.09          PTT - ( 27 Dec 2022 05:30 )  PTT:48.5 sec      RADIOLOGY & ADDITIONAL TESTS:     Neurology Stroke Progress Note    INTERVAL HPI/OVERNIGHT EVENTS:  Patient seen and examined. EDU O/N. Sitting up in bed, eating breakfast. Continues to be on hep gtt and ASA increased back to 325 s/p colonoscopy.     MEDICATIONS  (STANDING):  aspirin enteric coated 325 milliGRAM(s) Oral daily  atorvastatin 80 milliGRAM(s) Oral at bedtime  heparin  Infusion 1200 Unit(s)/Hr (12 mL/Hr) IV Continuous <Continuous>  polyethylene glycol 3350 17 Gram(s) Oral daily  senna 2 Tablet(s) Oral at bedtime  sodium chloride 0.9%. 1000 milliLiter(s) (100 mL/Hr) IV Continuous <Continuous>    MEDICATIONS  (PRN):  acetaminophen     Tablet .. 650 milliGRAM(s) Oral every 6 hours PRN Temp greater or equal to 38.5C (101.3F), Moderate Pain (4 - 6)  ondansetron Injectable 4 milliGRAM(s) IV Push every 6 hours PRN Nausea and/or Vomiting      Allergies    No Known Allergies    Intolerances        Vital Signs Last 24 Hrs  T(C): 36.9 (27 Dec 2022 04:44), Max: 37.2 (26 Dec 2022 16:14)  T(F): 98.5 (27 Dec 2022 04:44), Max: 98.9 (26 Dec 2022 16:14)  HR: 68 (27 Dec 2022 08:22) (68 - 88)  BP: 139/90 (27 Dec 2022 08:22) (115/62 - 153/97)  BP(mean): 108 (27 Dec 2022 08:22) (82 - 119)  RR: 18 (27 Dec 2022 08:22) (17 - 18)  SpO2: 98% (27 Dec 2022 08:22) (95% - 100%)    Parameters below as of 27 Dec 2022 08:22  Patient On (Oxygen Delivery Method): room air        Physical exam:  General: No acute distress, awake and alert  Eyes: Anicteric sclerae, moist conjunctivae, see below for CNs  Extremities: No edema    Neurologic:  -Mental status: Awake, alert, oriented to person, place, and time. Speech is fluent with intact naming, repetition, and comprehension, no dysarthria. Follows commands. Attention/concentration intact. Fund of knowledge appropriate.  -Cranial nerves:   II: Visual fields are full to confrontation.  III, IV, VI: Extraocular movements are intact without nystagmus. Pupils equally round and reactive to light.  V:  Facial sensation V1-V3 equal and intact   VII: Face is symmetric with normal eye closure and smile  IX, X: Uvula is midline   XII: Tongue protrudes midline  Motor: Normal bulk and tone. No pronator drift. Strength bilateral upper extremity 5/5, bilateral lower extremities 5/5.  Rapid alternating movements intact and symmetric  Sensation: Intact to light touch bilaterally. No neglect or extinction on double simultaneous testing.  Coordination: trace RUE dysmetria on finger-to-nose. trace RLE dysmetria with heel-to-shin. No dysmetria in LUE or LLE.  Gait: Deferred    LABS:                        13.8   4.27  )-----------( 193      ( 27 Dec 2022 05:30 )             41.3     12-27    137  |  106  |  11  ----------------------------<  101<H>  4.2   |  22  |  0.91    Ca    9.0      27 Dec 2022 05:30  Phos  3.0     12-27  Mg     2.1     12-27      PT/INR - ( 27 Dec 2022 05:30 )   PT: 13.0 sec;   INR: 1.09          PTT - ( 27 Dec 2022 05:30 )  PTT:48.5 sec      RADIOLOGY & ADDITIONAL TESTS:  < from: MR Angio Head No Cont (12.23.22 @ 10:23) >  IMPRESSION:    Examination is degraded by motion.    MRI brain: Evolving subacute right greater than left cerebellar   infarctions with patchy enhancement which is typical of subacute   infarctions. No new infarction or hemorrhage.    MRABRAIN: Findings consistent with patient's known right vertebral   artery occlusion. Attenuated caliber and flow-related signal within the   intracranial left vertebral artery which is similar to prior CTA 1   accounting for differences in technique. Lack of flow related signal   within the proximal to mid basilar artery which did demonstrate contrast   filling on prior CTA head. Although this may be artifactual due to   motion, underlying stenosis/occlusion cannot be excluded.    Findings were discussed with CHANDA Spain by Dr. Marco Ny on   12/23/2022 11:08 AM    --- End of Report ---    < end of copied text >       Neurology Stroke Progress Note    INTERVAL HPI/OVERNIGHT EVENTS:  Patient seen and examined. EDU O/N. Sitting up in bed, eating breakfast. Continues to be on hep gtt and ASA increased back to 325 s/p colonoscopy.     MEDICATIONS  (STANDING):  aspirin enteric coated 325 milliGRAM(s) Oral daily  atorvastatin 80 milliGRAM(s) Oral at bedtime  heparin  Infusion 1200 Unit(s)/Hr (12 mL/Hr) IV Continuous <Continuous>  polyethylene glycol 3350 17 Gram(s) Oral daily  senna 2 Tablet(s) Oral at bedtime  sodium chloride 0.9%. 1000 milliLiter(s) (100 mL/Hr) IV Continuous <Continuous>    MEDICATIONS  (PRN):  acetaminophen     Tablet .. 650 milliGRAM(s) Oral every 6 hours PRN Temp greater or equal to 38.5C (101.3F), Moderate Pain (4 - 6)  ondansetron Injectable 4 milliGRAM(s) IV Push every 6 hours PRN Nausea and/or Vomiting      Allergies    No Known Allergies    Intolerances        Vital Signs Last 24 Hrs  T(C): 36.9 (27 Dec 2022 04:44), Max: 37.2 (26 Dec 2022 16:14)  T(F): 98.5 (27 Dec 2022 04:44), Max: 98.9 (26 Dec 2022 16:14)  HR: 68 (27 Dec 2022 08:22) (68 - 88)  BP: 139/90 (27 Dec 2022 08:22) (115/62 - 153/97)  BP(mean): 108 (27 Dec 2022 08:22) (82 - 119)  RR: 18 (27 Dec 2022 08:22) (17 - 18)  SpO2: 98% (27 Dec 2022 08:22) (95% - 100%)    Parameters below as of 27 Dec 2022 08:22  Patient On (Oxygen Delivery Method): room air        Physical exam:  General: No acute distress, awake and alert  Eyes: Anicteric sclerae, moist conjunctivae, see below for CNs  Extremities: No edema    Neurologic:  -Mental status: Awake, alert, oriented to person, place, and time. Speech is fluent with intact naming, repetition, and comprehension, no dysarthria. Follows commands. Attention/concentration intact. Fund of knowledge appropriate.  -Cranial nerves:   II: Visual fields are full to confrontation.  III, IV, VI: Extraocular movements are intact without nystagmus. Pupils equally round and reactive to light.  V:  Facial sensation V1-V3 equal and intact   VII: Face is symmetric with normal eye closure and smile  IX, X: Uvula is midline   XII: Tongue protrudes midline  Motor: Normal bulk and tone. No pronator drift. Strength bilateral upper extremity 5/5, bilateral lower extremities 5/5.  Rapid alternating movements intact and symmetric  Sensation: Intact to light touch bilaterally. No neglect or extinction on double simultaneous testing.  Coordination: trace RUE dysmetria on finger-to-nose. trace RLE dysmetria with heel-to-shin. No dysmetria in LUE or LLE. dysdiadochokinesis of the RUE with CHRISTINA.  Gait: Deferred    LABS:                        13.8   4.27  )-----------( 193      ( 27 Dec 2022 05:30 )             41.3     12-27    137  |  106  |  11  ----------------------------<  101<H>  4.2   |  22  |  0.91    Ca    9.0      27 Dec 2022 05:30  Phos  3.0     12-27  Mg     2.1     12-27      PT/INR - ( 27 Dec 2022 05:30 )   PT: 13.0 sec;   INR: 1.09          PTT - ( 27 Dec 2022 05:30 )  PTT:48.5 sec      RADIOLOGY & ADDITIONAL TESTS:  < from: MR Angio Head No Cont (12.23.22 @ 10:23) >  IMPRESSION:    Examination is degraded by motion.    MRI brain: Evolving subacute right greater than left cerebellar   infarctions with patchy enhancement which is typical of subacute   infarctions. No new infarction or hemorrhage.    MRABRAIN: Findings consistent with patient's known right vertebral   artery occlusion. Attenuated caliber and flow-related signal within the   intracranial left vertebral artery which is similar to prior CTA 1   accounting for differences in technique. Lack of flow related signal   within the proximal to mid basilar artery which did demonstrate contrast   filling on prior CTA head. Although this may be artifactual due to   motion, underlying stenosis/occlusion cannot be excluded.    Findings were discussed with CHANDA Spain by Dr. Marco Ny on   12/23/2022 11:08 AM    --- End of Report ---    < end of copied text >

## 2022-12-27 NOTE — PROGRESS NOTE ADULT - PROBLEM SELECTOR PLAN 4
New rectal mass seen on CT LE angio  - flex sig with lower EUS with bx 12/23 -- 9cm john-rectal mass, unable to visualize mucosa due to rectal narrowing and perirectal mass  - f/u flex sig biopsy.  - Colorectal surgery following  - Tumor markers not elevated  - CT C/A/P to eval for extent of disease - pending final radiology read  - f/u MRI pelvis prior to dc per surgery team  - outpatient f/u with Dr. Barfield

## 2022-12-27 NOTE — DISCHARGE NOTE PROVIDER - HOSPITAL COURSE
HPI:  56yo M with PMHx of HTN, previous lumbar fusion (March 2022) who originally was BIBEMS to [facility] on 12/16/22 after an episode of extreme dizziness and gait instability that caused him to fall, unclear whether he hit his head or +LOC, but then describes episode of aphasia, contracture of bilateral hands and confusion. He reports that he lives in the basement of his apartment building and that his upstairs neighbor heard him fall and called an ambulance. Episode lasted approximately about 1-1.5 hours. On arrival to [place], CTH was negative, but MRI demonstrated multiple acute cerebellar ischemic infarcts. He was then started on aspirin, plavix and atorvastatin. 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 Patient was transferred to [facility] for further workup for possible complex bypass surgery. Of note, patient endorses that he had a brother who passed away from complications of multiple strokes but otherwise denies family history of bleeding/clotting disorders.     Denies previous episodes of gait instability or falls, visual changes, syncopal episodes. (22 Dec 2022 04:32)    Hospital Course:   12/22: Admitted. Plavix dc'd needs to be off for 1 week preop. echo performed, WNL. dopplers negative. CTH shows b/l R>L acute/subacute cerebellar infarcts. 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 vascular consulted, rec b/l arterial dopplers to eval tibial arteries. GI consulted, preop colonoscopy tomorrow. started on heparin gtt for stroke  12/23: EDU overnight, heparin gtt held at 2AM. pending colonoscopy today. Hypercoaguable panel ordered per stroke/neuro recs. Sigmoidoscopy performed multiple biopsies taken. Restarted Aspirin 81 mg. Arterial dopplers demonstrating left tibial artery new complete occlusion, right anterior tibial artery normal. MR Nova confirming right vertebral artery occlusion. MR brain demonstrating cerebellar infarcts no other masses or lesions seen. Stroke consulted and hypercoaguable workup sent.   12/24: EDU overnight, heparin drip restarted at midnight. Complaining of intermittent rectal pain, tylenol given. Hep gtt started. PTTs monitoring. CTCAP completed  12/25: EDU o/n neuro stable. remains on heparin gtt. Resumed aspirin 325 mg per gen surg. Pending CTCAP final results.    12/26: EDU o/n neuro stable. remains on heparin gtt 11/hr stable x 15 hours PTT between 50-70. Plan for angio tomorrow afternoon with Dr. Aguirre.   12/27: EDU o/n pending repeat angiogram     Patient evaluated by PT/OT who recommended: Home no needs     Hospital course c/b: No course complications     Exam on day of discharge:   GEN: laying in bed, appears well, NAD  NEURO: AOx3. FC, OE spont, speech intact, face symmetric. CNII-XII intact. PERRL, EOMI. No pronator drift. MAEx4. 5/5 strength throughout. SILT  CV: RRR +S1/S2  PULM: CTAB  GI: Abd soft, NT/ND  EXT: ext warm, dry, nontender    Checklist:   - Obtained follow up appointment from NP   - Reviewed final recommendations of inpatient consults, General Surgery/Colorectal Surgery, Neurology   - Imaging reviewed   - Neurologically stable for discharge  - Vitals stable for discharge   - Afebrile for discharge  - WBC is stable  - Sodium level is normal  - Pain is adequately controlled       HPI:  58yo M with PMHx of HTN, previous lumbar fusion (March 2022) who originally was BIBEMS to [facility] on 12/16/22 after an episode of extreme dizziness and gait instability that caused him to fall, unclear whether he hit his head or +LOC, but then describes episode of aphasia, contracture of bilateral hands and confusion. He reports that he lives in the basement of his apartment building and that his upstairs neighbor heard him fall and called an ambulance. Episode lasted approximately about 1-1.5 hours. On arrival to [place], CTH was negative, but MRI demonstrated multiple acute cerebellar ischemic infarcts. He was then started on aspirin, plavix and atorvastatin. 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 Patient was transferred to [facility] for further workup for possible complex bypass surgery. Of note, patient endorses that he had a brother who passed away from complications of multiple strokes but otherwise denies family history of bleeding/clotting disorders.     Denies previous episodes of gait instability or falls, visual changes, syncopal episodes. (22 Dec 2022 04:32)    Hospital Course:   12/22: Admitted. Plavix dc'd needs to be off for 1 week preop. echo performed, WNL. dopplers negative. CTH shows b/l R>L acute/subacute cerebellar infarcts. 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 vascular consulted, rec b/l arterial dopplers to eval tibial arteries. GI consulted, preop colonoscopy tomorrow. started on heparin gtt for stroke  12/23: EDU overnight, heparin gtt held at 2AM. pending colonoscopy today. Hypercoaguable panel ordered per stroke/neuro recs. Sigmoidoscopy performed multiple biopsies taken. Restarted Aspirin 81 mg. Arterial dopplers demonstrating left tibial artery new complete occlusion, right anterior tibial artery normal. MR Nova confirming right vertebral artery occlusion. MR brain demonstrating cerebellar infarcts no other masses or lesions seen. Stroke consulted and hypercoaguable workup sent.   12/24: DEU overnight, heparin drip restarted at midnight. Complaining of intermittent rectal pain, tylenol given. Hep gtt started. PTTs monitoring. CTCAP completed  12/25: EDU o/n neuro stable. remains on heparin gtt. Resumed aspirin 325 mg per gen surg. Pending CTCAP final results.    12/26: EDU o/n neuro stable. remains on heparin gtt 11/hr stable x 15 hours PTT between 50-70. Plan for angio tomorrow afternoon with Dr. Aguirre.   12/27: EDU o/n pending repeat angiogram     Patient evaluated by PT/OT who recommended: Home no needs     Hospital course c/b: No course complications     Exam on day of discharge:   GEN: laying in bed, appears well, NAD  NEURO: AOx3. FC, OE spont, speech intact, face symmetric. CNII-XII intact. PERRL, EOMI. No pronator drift. MAEx4. 5/5 strength throughout. SILT  CV: RRR +S1/S2  PULM: CTAB  GI: Abd soft, NT/ND  EXT: ext warm, dry, nontender    Checklist:   - Reviewed final recommendations of inpatient consults, General Surgery/Colorectal Surgery, Neurology   - Imaging reviewed   - Neurologically stable for discharge  - Vitals stable for discharge   - Afebrile for discharge  - WBC is stable  - Sodium level is normal  - Pain is adequately controlled

## 2022-12-27 NOTE — PROGRESS NOTE ADULT - PROBLEM SELECTOR PLAN 5
DVT ppx: Hep gtt with transition to oral AC as above
DVT ppx: Hep gtt

## 2022-12-27 NOTE — PROGRESS NOTE ADULT - PROBLEM SELECTOR PLAN 1
No longer planning for bypass, angiogram from [place] reviewed by Dr Jarvis and he has collaterals  - dopplers w/o DVT  - c/w ASA, holding plavix for possible procedures  - c/w statin  - LE CT angio incidentally found rectal mass, see plan as below  - plan per neurosurgical team

## 2022-12-27 NOTE — PROGRESS NOTE ADULT - SUBJECTIVE AND OBJECTIVE BOX
SUBJECTIVE: Pt seen and examined at bedside this am. Patient is feeling well and awaiting angiogram     MEDICATIONS  (STANDING):  aspirin enteric coated 325 milliGRAM(s) Oral daily  atorvastatin 80 milliGRAM(s) Oral at bedtime  heparin  Infusion 1200 Unit(s)/Hr (12 mL/Hr) IV Continuous <Continuous>  polyethylene glycol 3350 17 Gram(s) Oral daily  senna 2 Tablet(s) Oral at bedtime  sodium chloride 0.9%. 1000 milliLiter(s) (100 mL/Hr) IV Continuous <Continuous>    MEDICATIONS  (PRN):  acetaminophen     Tablet .. 650 milliGRAM(s) Oral every 6 hours PRN Temp greater or equal to 38.5C (101.3F), Moderate Pain (4 - 6)  ondansetron Injectable 4 milliGRAM(s) IV Push every 6 hours PRN Nausea and/or Vomiting      Vital Signs Last 24 Hrs  T(C): 36.9 (27 Dec 2022 04:44), Max: 37.2 (26 Dec 2022 16:14)  T(F): 98.5 (27 Dec 2022 04:44), Max: 98.9 (26 Dec 2022 16:14)  HR: 74 (27 Dec 2022 04:48) (74 - 88)  BP: 153/97 (27 Dec 2022 04:48) (115/62 - 153/97)  BP(mean): 119 (27 Dec 2022 04:48) (82 - 119)  RR: 18 (27 Dec 2022 04:48) (17 - 18)  SpO2: 95% (27 Dec 2022 04:48) (95% - 100%)    Parameters below as of 27 Dec 2022 04:48  Patient On (Oxygen Delivery Method): room air        Physical Exam  General: NAD, resting comfortably in bed  Pulmonary: Nonlabored breathing, no respiratory distress  CV: NSR  Abd: soft, NT/ND, no guarding  Extremities: (-) edema, warm, well-perfused, palpated DP/PT bialterally      I&O's Detail    26 Dec 2022 07:01  -  27 Dec 2022 07:00  --------------------------------------------------------  IN:    Heparin: 187 mL    sodium chloride 0.9%: 600 mL  Total IN: 787 mL    OUT:    Voided (mL): 850 mL  Total OUT: 850 mL    Total NET: -63 mL          LABS:                        13.8   4.27  )-----------( 193      ( 27 Dec 2022 05:30 )             41.3     12-27    137  |  106  |  11  ----------------------------<  101<H>  4.2   |  22  |  0.91    Ca    9.0      27 Dec 2022 05:30  Phos  3.0     12-27  Mg     2.1     12-27      PT/INR - ( 27 Dec 2022 05:30 )   PT: 13.0 sec;   INR: 1.09          PTT - ( 27 Dec 2022 05:30 )  PTT:48.5 sec      RADIOLOGY & ADDITIONAL STUDIES:

## 2022-12-27 NOTE — PROGRESS NOTE ADULT - ASSESSMENT
56yo M with PMHx of HTN, previous lumbar fusion (March 2022) who originally was BIBEMS to [facility] on 12/16/22 after an episode of extreme dizziness and gait instability that caused him to fall, admitted to [facility] for further workup and found to have b/l R>L acute/subacute cerebellar infarcts. Vascular consulted for evaluation of anterior tibial artery for use as a graft for a cerebral bypass. CTA showed occlusion of proximal left anterior tibialis artery with two-vessel runoff down to the foot and stenosis of the right anterior tibialis. Patient is asymptomatic and has palpable distal pulses bilaterally.    Plan:  - No acute vascular intervention at this time  - Will discuss operative planning between Dr. Lynn and Dr. Jarvis  - Vascular surgery Team 3C will continue to follow. Please call [phone] with any questions or concerns.

## 2022-12-27 NOTE — DISCHARGE NOTE PROVIDER - NSDCCPCAREPLAN_GEN_ALL_CORE_FT
PRINCIPAL DISCHARGE DIAGNOSIS  Diagnosis: Vertebral artery occlusion, right  Assessment and Plan of Treatment:       SECONDARY DISCHARGE DIAGNOSES  Diagnosis: HTN (hypertension)  Assessment and Plan of Treatment:     Diagnosis: Rectal mass  Assessment and Plan of Treatment:     Diagnosis: Cerebellar infarct  Assessment and Plan of Treatment:

## 2022-12-27 NOTE — PROGRESS NOTE ADULT - REASON FOR ADMISSION
stroke workup, possible bypass candidate

## 2022-12-27 NOTE — DISCHARGE NOTE NURSING/CASE MANAGEMENT/SOCIAL WORK - NSDCPEFALRISK_GEN_ALL_CORE
For information on Fall & Injury Prevention, visit: [email or web address] OR  [email or web address] OR  https://www.cdc.gov/steadi/patient.html

## 2022-12-27 NOTE — PROGRESS NOTE ADULT - ASSESSMENT
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  S/p flex sig and EUS on 12/23 with biopsies.       - Recommend MRI pelvis prior to discharge   - On discharge, follow up outpatient with Dr. Renan Barfield. [address] [place] [address] [address] [place] [address]. Call the office to schedule an appointment [phone].   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  S/p flex sig and EUS on 12/23 with biopsies.     - f/u staging CT final read.   - Recommend MRI pelvis prior to discharge   - On discharge, follow up outpatient with Dr. Renan Barfield. [address] [place] [address] [address] [place] [address]. Call the office to schedule an appointment [phone].   - Rest of care per primary team  - Surgery team 1C will continue to follow, [phone]

## 2022-12-27 NOTE — DISCHARGE NOTE NURSING/CASE MANAGEMENT/SOCIAL WORK - NSDCFUADDAPPT_GEN_ALL_CORE_FT
Please follow up with Dr. Jarvis's colleague, Dr. Ge, in the office.     Please follow up with Dr. العراقي, Neurology within 3 months of discharge. Call the office for appt.     Please follow up with General Surgery/Colorectal Surgery, Dr. Renan Barfield. Call the office to schedule an appointment [phone]. Address: [address] [place] [address].    Please follow up with Gastroenterology, Dr. Candelario. Call the office for appt.     Please follow up with your primary care doctor.

## 2022-12-27 NOTE — DISCHARGE NOTE PROVIDER - NSDCFUADDAPPT_GEN_ALL_CORE_FT
08/04/20-Reviewed COVID 19 care plan with wife/patient, verbalized understanding. Denies any s/s of COVID 19. Energy level is better. Letter to [employer] from PCP.     PLAN-  Close next week if no new needs.   Outpatient Care Management  COVID-19 Patient Assessment    Patient: Yan Choudhury  MRN: [id number]  Date of Service: 08/04/2020  Completed by: Rajani Almeida RN  Program: COVID-19    Reason for Visit   Patient presents with    COVID-19 Concerns    Update Plan Of Care       Brief Summary: see above note    Assessment Documentation     COVID-19 Assessment    Nurse Assessment via Chart Review:  Nurse Assessment with Patient:  For Fever:  For Coughing:  For Difficulty Breathing:  Psycho/Social Assessment:         Problem List and History     Patient Active Problem List   Diagnosis    Hypertension associated with diabetes    ED (erectile dysfunction)    Ischemic cardiomyopathy    Pulmonary HTN    Hyperlipidemia associated with type 2 diabetes mellitus    Loss of eye - Right Eye    Abnormal ECG    LBBB (left bundle branch block)    Abnormal stress test    Chronic combined systolic and diastolic congestive heart failure    Cysts of eyelids    Primary open-angle glaucoma, moderate stage    Biventricular ICD (implantable cardioverter-defibrillator) in place    Type 2 diabetes mellitus with stage 3 chronic kidney disease, with long-term current use of insulin    Gastroesophageal reflux disease    Chronic gout without tophus    Vitamin D deficiency    Onychomycosis of multiple toenails with type 2 diabetes mellitus    Prosthetic eye globe    Screening for colon cancer    History of colostomy reversal    Nocturnal hypoxemia    Alteration in skin integrity    Nonrheumatic aortic valve insufficiency    NSVT (nonsustained ventricular tachycardia)    Hyperparathyroidism    CAD, multiple vessel    Acute on chronic combined systolic and diastolic  heart failure with EF 15-20%    Class 2 severe obesity  due to excess calories with serious comorbidity and body mass index (BMI) of 35.0 to 35.9 in adult    Chronic ischemic heart disease    Elevated LFTs    COVID-19 virus infection       Reviewed Active Problem List with patient and/or Caregiver. The following were identified as areas of need: COVID 19    Medical History:  Reviewed medical history with patient and/or caregiver    Social History:  Reviewed social history with patient and/or caregiver    Complex Care Plan    Care plan was discussed and completed today with input from patient and/or caregiver.    Patient Instructions     Instructions were provided via the [employer] patient resources and are available for the patient to view on the patient portal, if active.    Next steps: follow up next week    Follow up in about 1 week (around 8/11/2020) for RN Follow up call.    Todays OPCM Self-Management Care Plan was developed with the patients/caregivers input and was based on identified barriers from todays assessment.  Goals were written today with the patient/caregiver and the patient has agreed to work towards these goals to improve his/her overall well-being. Patient verbalized understanding of the care plan, goals, and all of today's instructions. Encouraged patient/caregiver to communicate with his/her physician and health care team about health conditions and the treatment plan.  Provided my contact information today and encouraged patient/caregiver to call me with any questions as needed.     Please follow up with Dr. Jarvis's colleague, Dr. Ge, in the office.     Please follow up with Dr. العراقي, Neurology within 3 months of discharge. Call the office for appt.     Please follow up with General Surgery/Colorectal Surgery, Dr. Renan Barfield. Call the office to schedule an appointment [phone]. Address: [address] [place] [address].    Please follow up with Gastroenterology, Dr. Candelario. Call the office for appt.     Please follow up with your primary care doctor.

## 2022-12-27 NOTE — PROGRESS NOTE ADULT - PROBLEM SELECTOR PLAN 3
Can restart home amlodipine as needed
Can restart home amlodipine as needed
- restart home amlodipine as needed
Can restart home amlodipine as needed

## 2022-12-27 NOTE — PROGRESS NOTE ADULT - PROBLEM SELECTOR PLAN 2
Stroke core measures  - c/w ASA/statin as above  - plavix held due to possible procedure  - f/u Echo w/bubble  - possibly hypercoagulable state due to malignancy, see plan as below
Stroke core measures  - c/w ASA/statin as above  - plavix held due to possible procedure.  to discuss AC with neurology team, patient currently on full AC with heparin gtt.  Will likely DC on oral AC + either ASA or Plavix  - Echo w/bubble w/o signs of PFO  - possibly hypercoagulable state due to malignancy, see plan as below  - f/u Protein C/S, anticardiolipin, lupus assay, antithrombin III

## 2022-12-27 NOTE — DISCHARGE NOTE PROVIDER - NSDCFUADDINST_GEN_ALL_CORE_FT
Neurosurgery follow up appointment date/time: 1/20/22 @ 10:30 AM   - Please follow up with Dr. Ge (Neurosurgery)   - Please call the office to confirm appointment: [phone]     Activity:  - fatigue is common after surgery, rest if you feel tired   - no bending, lifting, twisting or heavy lifting   - walking is recommended, ambulate as tolerated   - you may shower when you get home, keep your incision dry   - no bathing    - no driving within 24 hours of anesthesia or while taking prescription pain medications   - keep hydrated, drink plenty of water     Inpatient consults:   - Neurology: Start dual antiplatelet therapy with Aspirin 81 mg daily and Brilinta 90 mg twice a day. Follow up with Dr. العراقي within 3 months.   - General Surgery/Colorectal Surgery:   - Gastroenterology: Follow up outpatient.   - Vascular: No follow up necessary.     Please also follow up with your primary care doctor.     Pain Expectations:  - pain after surgery is expected  - please take pain meds as prescribed     Medications:  -   - pain medications can cause constipation, you should eat a high fiber diet and may take a stool softener while on pain meds   - Avoid taking Advil (ibuprofen), Motrin (naproxen), or Aspirin for pain as they can cause bleeding     Call the office or come to ED if:  - wound has drainage or bleeding, increased redness or pain at incision site, neurological change, fever (>101), chills, night sweats, syncope, nausea/vomiting      Playback:  - there is a copy of your discharge instructions on playback health     WITHIN 24 HOURS OF DISCHARGE, PLEASE CONTACT NEURO PA  WITH ANY QUESTIONS OR CONCERNS: [phone]   OTHERWISE, PLEASE CALL THE OFFICE WITH ANY QUESTIONS OR CONCERNS: [phone] Neurosurgery follow up appointment date/time: 1/20/22 @ 10:30 AM   - Please follow up with Dr. Ge (Neurosurgery)   - Please call the office to confirm appointment: [phone]     Inpatient consults:   - Neurology: Start dual antiplatelet therapy with Aspirin 81 mg daily and Brilinta 90 mg (start tonight) twice a day. Follow up with Dr. العراقي within 3 months.   - General Surgery/Colorectal Surgery: Follow up outpatient with Dr. Renan Barfield.   - Gastroenterology: Follow up outpatient.   - Vascular: No follow up necessary.     Please also follow up with your primary care doctor.     Pain Expectations:  - pain after surgery is expected  - please take pain meds as prescribed     Medications:  - Please continue atorvastatin 80 mg daily for your cholesterol   - Please start aspirin 81 mg daily (start tomorrow 12/28)   - Please start Brilinta (Ticagrelor) 90 mg twice a day (start tonight 12/27)   - Can continue taking home amlodipine for high blood pressure, follow up with your primary care doctor within 1-2 weeks for BP check   - pain medications can cause constipation, you should eat a high fiber diet and may take a stool softener while on pain meds   - Avoid taking Advil (ibuprofen), Motrin (naproxen), or Aspirin for pain as they can cause bleeding     Call the office or come to ED if:  - wound has drainage or bleeding, increased redness or pain at incision site, neurological change, fever (>101), chills, night sweats, syncope, nausea/vomiting      Playback:  - there is a copy of your discharge instructions on playback health     WITHIN 24 HOURS OF DISCHARGE, PLEASE CONTACT NEURO PA  WITH ANY QUESTIONS OR CONCERNS: [phone]   OTHERWISE, PLEASE CALL THE OFFICE WITH ANY QUESTIONS OR CONCERNS: [phone]

## 2022-12-27 NOTE — PROGRESS NOTE ADULT - PROVIDER SPECIALTY LIST ADULT
Surgery
Neurosurgery
Vascular Surgery
Gastroenterology
Neurology
Neurosurgery
Surgery
Surgery
Vascular Surgery
Neurosurgery
Surgery
Hospitalist

## 2022-12-27 NOTE — PROGRESS NOTE ADULT - PROBLEM SELECTOR PROBLEM 3
HTN (hypertension)
Type Of Previous Surgery (Optional- Ie Mohs Surgery): MOHS surgery

## 2022-12-27 NOTE — DISCHARGE NOTE PROVIDER - NSDCMRMEDTOKEN_GEN_ALL_CORE_FT
amLODIPine 10 mg oral tablet: 1 tab(s) orally once a day   acetaminophen 325 mg oral tablet: 2 tab(s) orally every 6 hours, As needed, Temp greater or equal to 38.5C (101.3F), Moderate Pain (4 - 6)  amLODIPine 10 mg oral tablet: 1 tab(s) orally once a day  aspirin 81 mg oral delayed release tablet: 1 tab(s) orally once a day  atorvastatin 80 mg oral tablet: 1 tab(s) orally once a day (at bedtime)  ticagrelor 90 mg oral tablet: 1 tab(s) orally 2 times a day

## 2022-12-27 NOTE — DISCHARGE NOTE NURSING/CASE MANAGEMENT/SOCIAL WORK - PATIENT PORTAL LINK FT
You can access the FollowMyHealth Patient Portal offered by [facility] by registering at the following website: [email or web address]. By joining [employer]’s FollowMyHealth portal, you will also be able to view your health information using other applications (apps) compatible with our system.

## 2022-12-27 NOTE — DISCHARGE NOTE PROVIDER - CARE PROVIDER_API CALL
Gavin Ge)  Neurosurgery  [address], [address] [place] [address]  Phone: [phone]  Fax: [phone]  Established Patient  Follow Up Time:     Charlie العراقي)  Neurology; Vascular Neurology  [address], [address] [place] [address]  Phone: [phone]  Fax: [phone]  Established Patient  Follow Up Time:     Renan Barfield)  Surgery  [address] [place] [address]  Phone: [phone]  Fax: [phone]  Established Patient  Follow Up Time:     Jesse Candelario)  Gastroenterology; Internal Medicine  [address] [place] [address]  Phone: [phone]  Fax: [phone]  Established Patient  Follow Up Time:     SILVA JONES  General Surgery  Phone: ()-  Fax: ()-  Established Patient  Follow Up Time:

## 2022-12-27 NOTE — PROGRESS NOTE ADULT - ASSESSMENT
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 Patient was transferred to [facility] for further workup for possible complex bypass surgery.  Hospital course complicated by finding of new rectal mass, s/p flex sig on 12/24 with visualization limited due to perirectal narrowing and mass.

## 2022-12-27 NOTE — PROGRESS NOTE ADULT - ASSESSMENT
56yo M with PMHx of HTN, previous lumbar fusion (March 2022) who originally was BIBEMS to [facility] on 12/16/22 after an episode of extreme dizziness and gait instability. 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 Patient was transferred to [facility] for further workup for possible complex bypass surgery.     -Per primary team, repeat angio today was canceled given MR nova findings   -Dispo AC reccs pending discussion with Dr. العراقي 58yo M with PMHx of HTN, previous lumbar fusion (March 2022) who originally was BIBEMS to [facility] on 12/16/22 after an episode of extreme dizziness and gait instability. 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 Patient was transferred to [facility] for further workup for possible complex bypass surgery.     Plan:  -Per primary team, repeat angio today was canceled given MR nova findings   -Dispo AC reccs pending discussion with Dr. العراقي 58yo M with PMHx of HTN, previous lumbar fusion (March 2022) who originally was BIBEMS to [facility] on 12/16/22 after an episode of extreme dizziness and gait instability. 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 Patient was transferred to [facility] for further workup for possible complex bypass surgery.     Plan:  -Per primary team, repeat angio today was canceled given MR nova findings   -B/l strokes likely 2/2 to malignancy, RICKY not indicated at this time   -Cleared for d/c home with Brilinta 90 mg PO BID and ASA 81 mg PO QD x 3 months  -F/u with Dr. العراقي as an outpt     Discussed plan with neurology attending, Dr. العراقي  58yo M with PMHx of HTN, previous lumbar fusion (March 2022) who originally was BIBEMS to [facility] on 12/16/22 after an episode of extreme dizziness and gait instability. 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 Patient was transferred to [facility] for further workup for possible complex bypass surgery.     Plan:  -Per primary team, repeat angio today was canceled given MR nova findings   -B/l strokes likely 2/2 to malignancy, RICKY not indicated at this time   -DAPT recommended as an outpt with Brilinta 90 mg PO BID and ASA 81 mg PO QD x 3 months followed by monotherapy with ASA   -Dr. العراقي's office will call patient to schedule f/u visit    Discussed plan with neurology attending, Dr. العراقي

## 2022-12-27 NOTE — DISCHARGE NOTE PROVIDER - NSDCFUSCHEDAPPT_GEN_ALL_CORE_FT
Gavin Ge Physician [facility]  NEUROSURG [address] [facility] [address]  Scheduled Appointment: 01/20/2023

## 2022-12-27 NOTE — PROGRESS NOTE ADULT - PROBLEM SELECTOR PROBLEM 1
Vertebral artery occlusion, right

## 2022-12-27 NOTE — PROGRESS NOTE ADULT - SUBJECTIVE AND OBJECTIVE BOX
SUBJECTIVE: Pt seen and examined at bedside this am by surgery team. No rectal pain. No acute complaints     Vital Signs Last 24 Hrs  T(C): 36.2 (27 Dec 2022 09:04), Max: 37.2 (26 Dec 2022 16:14)  T(F): 97.2 (27 Dec 2022 09:04), Max: 98.9 (26 Dec 2022 16:14)  HR: 68 (27 Dec 2022 08:22) (68 - 88)  BP: 139/90 (27 Dec 2022 08:22) (115/62 - 153/97)  BP(mean): 108 (27 Dec 2022 08:22) (82 - 119)  RR: 18 (27 Dec 2022 08:22) (17 - 18)  SpO2: 98% (27 Dec 2022 08:22) (95% - 100%)    Parameters below as of 27 Dec 2022 08:22  Patient On (Oxygen Delivery Method): room air        PHYSICAL EXAM:   Gen: Awake, alert, NAD  CV: RRR  Pulm: no respiratory distress  Abd: soft, ND, NTTP, no rebound or guarding   Ext: WWP    I&O's Detail    26 Dec 2022 07:01  -  27 Dec 2022 07:00  --------------------------------------------------------  IN:    Heparin: 187 mL    sodium chloride 0.9%: 600 mL  Total IN: 787 mL    OUT:    Voided (mL): 850 mL  Total OUT: 850 mL    Total NET: -63 mL      27 Dec 2022 07:01  -  27 Dec 2022 10:23  --------------------------------------------------------  IN:    Oral Fluid: 240 mL    sodium chloride 0.9%: 200 mL  Total IN: 440 mL    OUT:    Voided (mL): 375 mL  Total OUT: 375 mL    Total NET: 65 mL          LABS:                        13.8   4.27  )-----------( 193      ( 27 Dec 2022 05:30 )             41.3     12-27    137  |  106  |  11  ----------------------------<  101<H>  4.2   |  22  |  0.91    Ca    9.0      27 Dec 2022 05:30  Phos  3.0     12-27  Mg     2.1     12-27        PT/INR - ( 27 Dec 2022 05:30 )   PT: 13.0 sec;   INR: 1.09          PTT - ( 27 Dec 2022 05:30 )  PTT:48.5 sec  CAPILLARY BLOOD GLUCOSE          RADIOLOGY & ADDITIONAL STUDIES: SUBJECTIVE: Pt seen and examined at bedside this am by surgery team. No rectal pain. No acute complaints. CT final read still pending.     Vital Signs Last 24 Hrs  T(C): 36.2 (27 Dec 2022 09:04), Max: 37.2 (26 Dec 2022 16:14)  T(F): 97.2 (27 Dec 2022 09:04), Max: 98.9 (26 Dec 2022 16:14)  HR: 68 (27 Dec 2022 08:22) (68 - 88)  BP: 139/90 (27 Dec 2022 08:22) (115/62 - 153/97)  BP(mean): 108 (27 Dec 2022 08:22) (82 - 119)  RR: 18 (27 Dec 2022 08:22) (17 - 18)  SpO2: 98% (27 Dec 2022 08:22) (95% - 100%)    Parameters below as of 27 Dec 2022 08:22  Patient On (Oxygen Delivery Method): room air        PHYSICAL EXAM:   Gen: Awake, alert, NAD  CV: RRR  Pulm: no respiratory distress  Abd: soft, ND, NTTP, no rebound or guarding   Ext: WWP    I&O's Detail    26 Dec 2022 07:01  -  27 Dec 2022 07:00  --------------------------------------------------------  IN:    Heparin: 187 mL    sodium chloride 0.9%: 600 mL  Total IN: 787 mL    OUT:    Voided (mL): 850 mL  Total OUT: 850 mL    Total NET: -63 mL      27 Dec 2022 07:01  -  27 Dec 2022 10:23  --------------------------------------------------------  IN:    Oral Fluid: 240 mL    sodium chloride 0.9%: 200 mL  Total IN: 440 mL    OUT:    Voided (mL): 375 mL  Total OUT: 375 mL    Total NET: 65 mL          LABS:                        13.8   4.27  )-----------( 193      ( 27 Dec 2022 05:30 )             41.3     12-27    137  |  106  |  11  ----------------------------<  101<H>  4.2   |  22  |  0.91    Ca    9.0      27 Dec 2022 05:30  Phos  3.0     12-27  Mg     2.1     12-27        PT/INR - ( 27 Dec 2022 05:30 )   PT: 13.0 sec;   INR: 1.09          PTT - ( 27 Dec 2022 05:30 )  PTT:48.5 sec  CAPILLARY BLOOD GLUCOSE          RADIOLOGY & ADDITIONAL STUDIES:

## 2022-12-27 NOTE — DISCHARGE NOTE PROVIDER - CARE PROVIDERS DIRECT ADDRESSES
[email or web address],[email or web address]

## 2022-12-28 PROBLEM — Z00.00 ENCOUNTER FOR PREVENTIVE HEALTH EXAMINATION: Status: ACTIVE | Noted: 2022-12-28

## 2022-12-29 DIAGNOSIS — K62.9 DISEASE OF ANUS AND RECTUM, UNSPECIFIED: ICD-10-CM

## 2022-12-29 DIAGNOSIS — I65.01 OCCLUSION AND STENOSIS OF RIGHT VERTEBRAL ARTERY: ICD-10-CM

## 2022-12-29 DIAGNOSIS — I74.3 EMBOLISM AND THROMBOSIS OF ARTERIES OF THE LOWER EXTREMITIES: ICD-10-CM

## 2022-12-29 DIAGNOSIS — I10 ESSENTIAL (PRIMARY) HYPERTENSION: ICD-10-CM

## 2022-12-29 DIAGNOSIS — I63.9 CEREBRAL INFARCTION, UNSPECIFIED: ICD-10-CM

## 2022-12-29 DIAGNOSIS — I77.1 STRICTURE OF ARTERY: ICD-10-CM

## 2022-12-29 DIAGNOSIS — C49.A2 GASTROINTESTINAL STROMAL TUMOR OF STOMACH: ICD-10-CM

## 2022-12-29 PROBLEM — Z00.00 ENCOUNTER FOR PREVENTIVE HEALTH EXAMINATION: Noted: 2022-12-29

## 2022-12-29 LAB
APCR PPP: 2.65 RATIO — SIGNIFICANT CHANGE UP
NON-GYNECOLOGICAL CYTOLOGY STUDY: SIGNIFICANT CHANGE UP

## 2023-01-03 LAB
CONFIRM APTT STACLOT: NEGATIVE — SIGNIFICANT CHANGE UP
DRVVT SCREEN TO CONFIRM RATIO: SIGNIFICANT CHANGE UP
LA NT DPL PPP QL: 29.2 SEC — SIGNIFICANT CHANGE UP

## 2023-01-05 ENCOUNTER — NON-APPOINTMENT (OUTPATIENT)
Age: 58
End: 2023-01-05

## 2023-01-06 ENCOUNTER — NON-APPOINTMENT (OUTPATIENT)
Age: 58
End: 2023-01-06

## 2023-01-11 PROBLEM — Z00.00 ENCOUNTER FOR PREVENTIVE HEALTH EXAMINATION: Noted: 2023-01-11

## 2023-01-13 ENCOUNTER — APPOINTMENT (OUTPATIENT)
Dept: COLORECTAL SURGERY | Facility: CLINIC | Age: 58
End: 2023-01-13
Payer: MEDICAID

## 2023-01-13 VITALS
HEART RATE: 88 BPM | BODY MASS INDEX: 34.67 KG/M2 | SYSTOLIC BLOOD PRESSURE: 129 MMHG | HEIGHT: 72 IN | RESPIRATION RATE: 14 BRPM | DIASTOLIC BLOOD PRESSURE: 89 MMHG | OXYGEN SATURATION: 98 % | WEIGHT: 256 LBS

## 2023-01-13 DIAGNOSIS — I65.09 OCCLUSION AND STENOSIS OF UNSPECIFIED VERTEBRAL ARTERY: ICD-10-CM

## 2023-01-13 DIAGNOSIS — I63.9 CEREBRAL INFARCTION, UNSPECIFIED: ICD-10-CM

## 2023-01-13 DIAGNOSIS — Z86.79 PERSONAL HISTORY OF OTHER DISEASES OF THE CIRCULATORY SYSTEM: ICD-10-CM

## 2023-01-13 PROCEDURE — 99214 OFFICE O/P EST MOD 30 MIN: CPT | Mod: 25

## 2023-01-13 PROCEDURE — 46600 DIAGNOSTIC ANOSCOPY SPX: CPT

## 2023-01-13 RX ORDER — TICAGRELOR 90 MG/1
90 TABLET ORAL
Refills: 0 | Status: ACTIVE | COMMUNITY

## 2023-01-13 RX ORDER — ATORVASTATIN CALCIUM 80 MG/1
80 TABLET, FILM COATED ORAL
Refills: 0 | Status: ACTIVE | COMMUNITY

## 2023-01-13 RX ORDER — AMLODIPINE BESYLATE 5 MG/1
TABLET ORAL
Refills: 0 | Status: ACTIVE | COMMUNITY

## 2023-01-13 NOTE — HISTORY OF PRESENT ILLNESS
[FreeTextEntry1] : 57M recently hospitalized at [facility] with concern for an acute cerebellar ischemic stroke with right vertebral artery occlusion.  On initial preoperative work up for possible bypass, was incidentally found to have a large low rectal mass. Subsequent flexible sigmoidoscopy performed by Dr. Candelario, revealed that the mass was submucosal and biopsy consistent with GIST. Metastatic work up was unrevealing. Pt here today for interval follow up. Pt reports a several month to year hx of smaller caliber stools with less cathartic emptying. He denies any pain, bleeding or discharge. Denies any fecal soilage or incontinence. \par

## 2023-01-13 NOTE — PHYSICAL EXAM
[Normal Breath Sounds] : Normal breath sounds [Normal Heart Sounds] : normal heart sounds [No Rash or Lesion] : No rash or lesion [Alert] : alert [Calm] : calm [Abdomen Masses] : No abdominal masses [Abdomen Tenderness] : ~T No ~M abdominal tenderness [JVD] : no jugular venous distention  [de-identified] : Well appearing male in NAD [de-identified] : MMM [de-identified] : ROM WNL [FreeTextEntry1] : The pt was examined in the prone uche-knife position. Visual examination of the anal verge with effacement of the buttocks revealed no masses, ulcerations, fissures or skin rashes. Digital rectal exam revealed a palpable firm submucosal mass in the right anterior lateral quadrant roughly 8 cm from the verge.No blood on gloved finger and sphincter tone within normal limits and appropriate anal squeeze. A lubricated anoscope was then inserted revealing healthy appearing anoderm with three appropriately sized, noninflamed internal hemorrhoids.  The patient tolerated the exam well.

## 2023-01-13 NOTE — HISTORY OF PRESENT ILLNESS
[de-identified] : 57 year old man with past medical history HTN, previous lumbar fusion (March 2022) who originally was brought in by ambulance on 12/22/22 to [facility]; after an episode of extreme dizziness and gait instability that caused him to call. It was unclear if he hit his head or LOC but following the fall, he had episode of aphasia, contracture of bilateral hands and confusion. His neighbor called an ambulance. The episode lasted about 1-1.5 hours. CT head was negative on arrival. MRI brain demonstrated multiple acute cerebellar ischemic infarcts. He was started on ASA, plavix and atorvastatin. 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 He was transferred to [facility] for further workup for complex bypass surgery. \par \par CTA bilateral lower extremities while hospitalized demonstrated LEFT anterior tibialis occlusion, RIGHT anterior tibialis stenosis, possible GIST tumor. GI consulted and he had colonoscopy and biopsies were done. \par \par MRA NOVA demonstrated right vertebral artery occlusion. \par \par He was discharged home and recommended follow up with general surgery regarding GIST tumor.\par He comes to the office today for discussion of possible bypass surgery.

## 2023-01-20 ENCOUNTER — NON-APPOINTMENT (OUTPATIENT)
Age: 58
End: 2023-01-20

## 2023-01-20 ENCOUNTER — APPOINTMENT (OUTPATIENT)
Dept: NEUROSURGERY | Facility: CLINIC | Age: 58
End: 2023-01-20

## 2023-01-23 ENCOUNTER — APPOINTMENT (OUTPATIENT)
Dept: MRI IMAGING | Facility: HOSPITAL | Age: 58
End: 2023-01-23

## 2023-01-31 PROBLEM — I10 ESSENTIAL (PRIMARY) HYPERTENSION: Chronic | Status: ACTIVE | Noted: 2021-12-28

## 2023-02-01 ENCOUNTER — APPOINTMENT (OUTPATIENT)
Dept: HEMATOLOGY ONCOLOGY | Facility: CLINIC | Age: 58
End: 2023-02-01
Payer: MEDICAID

## 2023-02-01 VITALS
DIASTOLIC BLOOD PRESSURE: 91 MMHG | RESPIRATION RATE: 18 BRPM | WEIGHT: 249 LBS | TEMPERATURE: 98.2 F | HEART RATE: 86 BPM | SYSTOLIC BLOOD PRESSURE: 128 MMHG | BODY MASS INDEX: 33.72 KG/M2 | OXYGEN SATURATION: 99 % | HEIGHT: 72 IN

## 2023-02-01 DIAGNOSIS — C49.A5 GASTROINTESTINAL STROMAL TUMOR OF RECTUM: ICD-10-CM

## 2023-02-01 DIAGNOSIS — I63.9 CEREBRAL INFARCTION, UNSPECIFIED: ICD-10-CM

## 2023-02-01 PROCEDURE — 99205 OFFICE O/P NEW HI 60 MIN: CPT

## 2023-02-01 RX ORDER — IMATINIB MESYLATE 400 MG/1
400 TABLET, FILM COATED ORAL DAILY
Qty: 1 | Refills: 5 | Status: ACTIVE | COMMUNITY
Start: 2023-02-01 | End: 1900-01-01

## 2023-02-01 NOTE — PHYSICAL EXAM
[Normal] : grossly intact [Fully active, able to carry on all pre-disease performance without restriction] : Status 0 - Fully active, able to carry on all pre-disease performance without restriction [de-identified] : anxious

## 2023-02-01 NOTE — ASSESSMENT
[FreeTextEntry1] : 57 M recently diagnosed GIST, here today for interval follow up after referral from surgery (Dr. Barfield). Here for initial oncologic evaluation.\par \par Oncologic History:\par 12/22/22 CT Angio LE incidental finding of Posterior pelvic mass, involving the right mid, low rectal wall and extending to the right ischioanal fossa, 7.0 x 6.6 x 9.5 cm, probably tumor submucosal in origin, possibly GIST. No adenopathy.\par 12/23/24 Flex sig with biopsy, pathology showing spindle cell neoplasm with IHC most consistent with GIST. Onkosight with a mutation in KIT ([id number]).\par 12/24/22 CT Chest/Abdomen/Pelvis re demonstrating mass, no obvious metastasis. Few solid pulmonary micronodules are nonspecific, reassess on follow-up.\par \par # GIST with likely moderate/high risk\par Given size of tumor, referred to medical oncology by Dr. Barfield. Patient with KIT positive GIST tumor. Discussed risks and benefits of neoadjuvant imatinib with patient and family. Will initiate treatment with goal of downstaging tumor size prior to surgery. Questions answered and emotional support provided.\par - Start Imatinib 400mg daily\par - f/u MR Pelvis\par - Will discuss at tumor board\par \par RTC in 2-3 weeks after MR Pelvis and on Imatinib\par CBC, CMP

## 2023-02-01 NOTE — END OF VISIT
[] : Fellow [FreeTextEntry3] : \par I evaluated the patient with the Hematology/Oncology fellow, Dr Henson.\par \par Oncologic history:\par 1.  GIST of rectum\par --presentation:  incidental finding of 7 x 6 x 9 cm posterior pelvic mass involving rectum on CT scan of the LE performed for pre-surgical evaluation prior to vascular bypass surgery.  12/2022\par --colonoscopy 12/23/22:  luminal narrowing in the rectum.  Mucosa normal.  EUS showed approx 9 cm non-mucosal john-rectal mass.  biopsy:  GIST.  exon 11 kit mutation\par --CT c/a/p with two sub-centimeter (0.5 cm) lung nodules - no distant metastatic disease\par \par Patient has consulted w/ Dr Barfield.  MRI is pending.  Due to the bulky size of the mass, neoadjuvant treatment with imatinib was recommended.  \par \par Plan:\par 1.  GIST\par - reviewed biopsy results, imaging results with pt and his wife\par - prognosis discussed\par - proceed w/ pelvic MRI;  multidisciplinary tumor board evaluation following that study\par - agree w/ Dr Barfield's recommendation for neoadjuvant treatment with imatinib.  Risks, side effects discussed w/ the patient. literature provided for him to take home to review.  CBC, CMP were reassuring in the hospital and can be repeated when he follows up to monitor for toxicity\par - would anticipate repeat scans (CT scans or MRI) for response assessment after 2-3 months of treatment.\par \par 2.  recent hx stroke, hypoplastic vertebral artery\par - follow up with neurosurgery and neurology\par - gastrointestinal stromal tumors have a good prognosis and should not preclude evaluation and treatment from a neurological standpoint\par \par 3. anxiety regarding diagnosis\par --support offered\par - will ask SW to reach out to patient\par - consider psycho-oncology evaluation if this continues to present a barrier for treatment (he cites this as the reason for multiple missed appts)

## 2023-02-01 NOTE — HISTORY OF PRESENT ILLNESS
[de-identified] : 57 M recently hospitalized at [facility] with concern for an acute cerebellar ischemic stroke with right vertebral artery occlusion. On initial preoperative work up for possible vascular bypass, was incidentally found to have a large low rectal mass. Subsequent flexible sigmoidoscopy performed by Dr. Candelario, revealed that the mass was submucosal and biopsy consistent with GIST. Metastatic work up was unrevealing. Tumor pathology showing a mutation in KIT (exon 11). Pt here today for interval follow up after referral from surgery (Dr. Barfield). Here for initial oncologic evaluation.\par \par PMH, PSH reviewed and updated in Allscripts\par FMH: negative for cancer\par SH:  .  retired .  recently quit smoking.  denies ETOH.  + marijuana.  lives in [place]

## 2023-02-02 ENCOUNTER — NON-APPOINTMENT (OUTPATIENT)
Age: 58
End: 2023-02-02

## 2023-02-08 ENCOUNTER — NON-APPOINTMENT (OUTPATIENT)
Age: 58
End: 2023-02-08

## 2023-02-08 ENCOUNTER — OUTPATIENT (OUTPATIENT)
Dept: OUTPATIENT SERVICES | Facility: HOSPITAL | Age: 58
LOS: 1 days | End: 2023-02-08
Payer: MEDICAID

## 2023-02-08 ENCOUNTER — APPOINTMENT (OUTPATIENT)
Dept: MRI IMAGING | Facility: HOSPITAL | Age: 58
End: 2023-02-08

## 2023-02-08 DIAGNOSIS — Z98.1 ARTHRODESIS STATUS: Chronic | ICD-10-CM

## 2023-02-08 PROCEDURE — 72197 MRI PELVIS W/O & W/DYE: CPT | Mod: 26

## 2023-02-08 PROCEDURE — 72197 MRI PELVIS W/O & W/DYE: CPT

## 2023-02-08 PROCEDURE — A9585: CPT

## 2023-02-16 ENCOUNTER — NON-APPOINTMENT (OUTPATIENT)
Age: 58
End: 2023-02-16

## 2023-02-21 NOTE — ASSESSMENT
[FreeTextEntry1] : 57 M recently diagnosed GIST, here today for interval follow up after referral from surgery (Dr. Barfield). Here for initial oncologic evaluation.\par \par Oncologic History:\par 12/22/22 CT Angio LE incidental finding of Posterior pelvic mass, involving the right mid, low rectal wall and extending to the right ischioanal fossa, 7.0 x 6.6 x 9.5 cm, probably tumor submucosal in origin, possibly GIST. No adenopathy.\par 12/23/24 Flex sig with biopsy, pathology showing spindle cell neoplasm with IHC most consistent with GIST. Onkosight with a mutation in KIT ([id number]).\par 12/24/22 CT Chest/Abdomen/Pelvis re demonstrating mass, no obvious metastasis. Few solid pulmonary micronodules are nonspecific, reassess on follow-up.\par \par # GIST with likely moderate/high risk\par Given size of tumor, referred to medical oncology by Dr. Barfield. Patient with KIT positive GIST tumor. Discussed risks and benefits of neoadjuvant imatinib with patient and family. Will initiate treatment with goal of downstaging tumor size prior to surgery. Questions answered and emotional support provided.\par - Start Imatinib 400mg daily\par - f/u MR Pelvis\par - Will discuss at tumor board\par \par RTC in 2-3 weeks after MR Pelvis and on Imatinib\par CBC, CMP

## 2023-02-21 NOTE — HISTORY OF PRESENT ILLNESS
[de-identified] : 57 M recently hospitalized at [facility] with concern for an acute cerebellar ischemic stroke with right vertebral artery occlusion. On initial preoperative work up for possible vascular bypass, was incidentally found to have a large low rectal mass. Subsequent flexible sigmoidoscopy performed by Dr. Candelario, revealed that the mass was submucosal and biopsy consistent with GIST. Metastatic work up was unrevealing. Tumor pathology showing a mutation in KIT (exon 11). Pt here today for interval follow up after referral from surgery (Dr. Barfield). Here for f/u. \par \par PMH, PSH reviewed and updated in Allscripts\par FMH: negative for cancer\par SH:  .  retired .  recently quit smoking.  denies ETOH.  + marijuana.  lives in [place]\par \par 2/8/23 MR PELVIS RECTAL ANAL WAW IC\par \par 1. Since December 24, 2022, unchanged probable mid to low rectal GIST.

## 2023-02-21 NOTE — PHYSICAL EXAM
[Fully active, able to carry on all pre-disease performance without restriction] : Status 0 - Fully active, able to carry on all pre-disease performance without restriction [Normal] : grossly intact [de-identified] : anxious

## 2023-02-22 ENCOUNTER — NON-APPOINTMENT (OUTPATIENT)
Age: 58
End: 2023-02-22

## 2023-02-22 ENCOUNTER — APPOINTMENT (OUTPATIENT)
Dept: HEMATOLOGY ONCOLOGY | Facility: CLINIC | Age: 58
End: 2023-02-22

## 2023-03-07 NOTE — HISTORY OF PRESENT ILLNESS
[de-identified] : 57 M recently hospitalized at [facility] with concern for an acute cerebellar ischemic stroke with right vertebral artery occlusion. On initial preoperative work up for possible vascular bypass, was incidentally found to have a large low rectal mass. Subsequent flexible sigmoidoscopy performed by Dr. Candelario, revealed that the mass was submucosal and biopsy consistent with GIST. Metastatic work up was unrevealing. Tumor pathology showing a mutation in KIT (exon 11). Pt here today for interval follow up after referral from surgery (Dr. Barfield). Here for f/u. \par \par PMH, PSH reviewed and updated in Allscripts\par FMH: negative for cancer\par SH:  .  retired .  recently quit smoking.  denies ETOH.  + marijuana.  lives in [place]\par \par 2/8/23 MR PELVIS RECTAL ANAL WAW IC\par \par 1. Since December 24, 2022, unchanged probable mid to low rectal GIST.

## 2023-03-07 NOTE — ASSESSMENT
[FreeTextEntry1] : 57 M recently diagnosed GIST, here today for interval follow up after referral from surgery (Dr. Barfield). Here for initial oncologic evaluation.\par \par Oncologic History:\par 12/22/22 CT Angio LE incidental finding of Posterior pelvic mass, involving the right mid, low rectal wall and extending to the right ischioanal fossa, 7.0 x 6.6 x 9.5 cm, probably tumor submucosal in origin, possibly GIST. No adenopathy.\par 12/23/24 Flex sig with biopsy, pathology showing spindle cell neoplasm with IHC most consistent with GIST. Onkosight with a mutation in KIT ([id number]).\par 12/24/22 CT Chest/Abdomen/Pelvis re demonstrating mass, no obvious metastasis. Few solid pulmonary micronodules are nonspecific, reassess on follow-up.\par \par # GIST with likely moderate/high risk\par Given size of tumor, referred to medical oncology by Dr. Barfield. Patient with KIT positive GIST tumor. Discussed risks and benefits of neoadjuvant imatinib with patient and family. Will initiate treatment with goal of downstaging tumor size prior to surgery. Questions answered and emotional support provided.\par - Start Imatinib 400mg daily\par - f/u MR Pelvis\par - Will discuss at tumor board\par \par RTC in 2-3 weeks after MR Pelvis and on Imatinib\par CBC, CMP

## 2023-03-07 NOTE — HISTORY OF PRESENT ILLNESS
[de-identified] : 57 M recently hospitalized at [facility] with concern for an acute cerebellar ischemic stroke with right vertebral artery occlusion. On initial preoperative work up for possible vascular bypass, was incidentally found to have a large low rectal mass. Subsequent flexible sigmoidoscopy performed by Dr. Candelario, revealed that the mass was submucosal and biopsy consistent with GIST. Metastatic work up was unrevealing. Tumor pathology showing a mutation in KIT (exon 11). Pt here today for interval follow up after referral from surgery (Dr. Barfield). Here for f/u. \par \par PMH, PSH reviewed and updated in Allscripts\par FMH: negative for cancer\par SH:  .  retired .  recently quit smoking.  denies ETOH.  + marijuana.  lives in [place]\par \par 2/8/23 MR PELVIS RECTAL ANAL WAW IC\par \par 1. Since December 24, 2022, unchanged probable mid to low rectal GIST.

## 2023-03-07 NOTE — PHYSICAL EXAM
[Fully active, able to carry on all pre-disease performance without restriction] : Status 0 - Fully active, able to carry on all pre-disease performance without restriction [Normal] : grossly intact [de-identified] : anxious

## 2023-03-07 NOTE — ASSESSMENT
[FreeTextEntry1] : 57 M recently diagnosed GIST, here today for interval follow up after referral from surgery (Dr. Barfield). Here for initial oncologic evaluation.\par \par Oncologic History:\par 12/22/22 CT Angio LE incidental finding of Posterior pelvic mass, involving the right mid, low rectal wall and extending to the right ischioanal fossa, 7.0 x 6.6 x 9.5 cm, probably tumor submucosal in origin, possibly GIST. No adenopathy.\par 12/23/24 Flex sig with biopsy, pathology showing spindle cell neoplasm with IHC most consistent with GIST. Onkosight with a mutation in KIT ([id number]).\par 12/24/22 CT Chest/Abdomen/Pelvis re demonstrating mass, no obvious metastasis. Few solid pulmonary micronodules are nonspecific, reassess on follow-up.\par \par # GIST with likely moderate/high risk\par Given size of tumor, referred to medical oncology by Dr. Barfield. Patient with KIT positive GIST tumor. Discussed risks and benefits of neoadjuvant imatinib with patient and family. Will initiate treatment with goal of downstaging tumor size prior to surgery. Questions answered and emotional support provided.\par - Start Imatinib 400mg daily\par - f/u MR Pelvis\par - Will discuss at tumor board\par \par RTC in 2-3 weeks after MR Pelvis and on Imatinib\par CBC, CMP

## 2023-03-07 NOTE — END OF VISIT
[FreeTextEntry3] : \par I evaluated the patient with the Hematology/Oncology fellow, Dr Henson.\par \par Oncologic history:\par 1.  GIST of rectum\par --presentation:  incidental finding of 7 x 6 x 9 cm posterior pelvic mass involving rectum on CT scan of the LE performed for pre-surgical evaluation prior to vascular bypass surgery.  12/2022\par --colonoscopy 12/23/22:  luminal narrowing in the rectum.  Mucosa normal.  EUS showed approx 9 cm non-mucosal john-rectal mass.  biopsy:  GIST.  exon 11 kit mutation\par --CT c/a/p with two sub-centimeter (0.5 cm) lung nodules - no distant metastatic disease\par \par Patient has consulted w/ Dr Barfield.  MRI is pending.  Due to the bulky size of the mass, neoadjuvant treatment with imatinib was recommended.  \par \par Plan:\par 1.  GIST\par - reviewed biopsy results, imaging results with pt and his wife\par - prognosis discussed\par - proceed w/ pelvic MRI;  multidisciplinary tumor board evaluation following that study\par - agree w/ Dr Barfield's recommendation for neoadjuvant treatment with imatinib.  Risks, side effects discussed w/ the patient. literature provided for him to take home to review.  CBC, CMP were reassuring in the hospital and can be repeated when he follows up to monitor for toxicity\par - would anticipate repeat scans (CT scans or MRI) for response assessment after 2-3 months of treatment.\par \par 2.  recent hx stroke, hypoplastic vertebral artery\par - follow up with neurosurgery and neurology\par - gastrointestinal stromal tumors have a good prognosis and should not preclude evaluation and treatment from a neurological standpoint\par \par 3. anxiety regarding diagnosis\par --support offered\par - will ask SW to reach out to patient\par - consider psycho-oncology evaluation if this continues to present a barrier for treatment (he cites this as the reason for multiple missed appts)

## 2023-03-07 NOTE — END OF VISIT
[FreeTextEntry3] : \par I evaluated the patient with the Hematology/Oncology fellow, Dr Henson.\par \par Oncologic history:\par 1.  GIST of rectum\par --presentation:  incidental finding of 7 x 6 x 9 cm posterior pelvic mass involving rectum on CT scan of the LE performed for pre-surgical evaluation prior to vascular bypass surgery.  12/2022\par --colonoscopy 12/23/22:  luminal narrowing in the rectum.  Mucosa normal.  EUS showed approx 9 cm non-mucosal john-rectal mass.  biopsy:  GIST.  exon 11 kit mutation\par --CT c/a/p with two sub-centimeter (0.5 cm) lung nodules - no distant metastatic disease\par \par Patient has consulted w/ Dr Barfield.  MRI is pending.  Due to the bulky size of the mass, neoadjuvant treatment with imatinib was recommended.  \par \par Plan:\par 1.  GIST\par - reviewed biopsy results, imaging results with pt and his wife\par - prognosis discussed\par - proceed w/ pelvic MRI;  multidisciplinary tumor board evaluation following that study\par - agree w/ Dr Barfield's recommendation for neoadjuvant treatment with imatinib.  Risks, side effects discussed w/ the patient. literature provided for him to take home to review.  CBC, CMP were reassuring in the hospital and can be repeated when he follows up to monitor for toxicity\par - would anticipate repeat scans (CT scans or MRI) for response assessment after 2-3 months of treatment.\par \par 2.  recent hx stroke, hypoplastic vertebral artery\par - follow up with neurosurgery and neurology\par - gastrointestinal stromal tumors have a good prognosis and should not preclude evaluation and treatment from a neurological standpoint\par \par 3. anxiety regarding diagnosis\par --support offered\par - will ask SW to reach out to patient\par - consider psycho-oncology evaluation if this continues to present a barrier for treatment (he cites this as the reason for multiple missed appts) [] : Fellow

## 2023-03-08 ENCOUNTER — APPOINTMENT (OUTPATIENT)
Dept: HEMATOLOGY ONCOLOGY | Facility: CLINIC | Age: 58
End: 2023-03-08

## 2023-03-29 ENCOUNTER — NON-APPOINTMENT (OUTPATIENT)
Age: 58
End: 2023-03-29

## 2023-04-03 ENCOUNTER — NON-APPOINTMENT (OUTPATIENT)
Age: 58
End: 2023-04-03

## 2023-04-05 NOTE — OCCUPATIONAL THERAPY INITIAL EVALUATION ADULT - MD/RN NOTIFIED
[facility]: Palliative Care                                                                                        Received mychart message from pharmacy requesting refill of flexeril.     Last refill: 12/14/2022  Last office visit: 2/1/2023  Scheduled for follow up 5/17/2023      Will route request to MD for review.      Reviewed [place]  Report.       Signature:  RENNY aPrkN, RN, OCN     yes

## 2023-04-11 PROBLEM — I65.09 VERTEBRAL ARTERY OCCLUSION: Status: ACTIVE | Noted: 2023-04-11

## 2023-04-13 ENCOUNTER — APPOINTMENT (OUTPATIENT)
Dept: NEUROSURGERY | Facility: CLINIC | Age: 58
End: 2023-04-13

## 2023-04-13 DIAGNOSIS — I65.09 OCCLUSION AND STENOSIS OF UNSPECIFIED VERTEBRAL ARTERY: ICD-10-CM

## 2023-05-25 ENCOUNTER — APPOINTMENT (OUTPATIENT)
Dept: COLORECTAL SURGERY | Facility: CLINIC | Age: 58
End: 2023-05-25

## 2023-06-02 ENCOUNTER — APPOINTMENT (OUTPATIENT)
Dept: HEMATOLOGY ONCOLOGY | Facility: CLINIC | Age: 58
End: 2023-06-02

## 2023-07-06 RX ORDER — KRILL/OM-3/DHA/EPA/PHOSPHO/AST 1000-230MG
81 CAPSULE ORAL
Qty: 30 | Refills: 2 | Status: ACTIVE | COMMUNITY
Start: 2023-04-03 | End: 1900-01-01

## 2023-10-17 RX ORDER — TICAGRELOR 90 MG/1
90 TABLET ORAL TWICE DAILY
Qty: 60 | Refills: 4 | Status: ACTIVE | COMMUNITY
Start: 2023-10-17 | End: 1900-01-01

## 2023-11-29 NOTE — OCCUPATIONAL THERAPY INITIAL EVALUATION ADULT - STANDING BALANCE: STATIC
[place] here for Xgeva injection today. Pt received injection in left arm with no problems.    Patient denies any concerns with previous injections.  See MAR for injection details.   Patient tolerated procedure well.   Patient discharged in stable, ambulatory condition..    
good balance

## 2024-04-19 RX ORDER — TICAGRELOR 90 MG/1
90 TABLET ORAL TWICE DAILY
Qty: 60 | Refills: 2 | Status: ACTIVE | COMMUNITY
Start: 2023-04-03

## 2024-04-19 RX ORDER — KRILL/OM-3/DHA/EPA/PHOSPHO/AST 1000-230MG
81 CAPSULE ORAL DAILY
Qty: 1 | Refills: 1 | Status: ACTIVE | COMMUNITY
Start: 2024-04-19 | End: 1900-01-01

## 2024-04-19 RX ORDER — TICAGRELOR 90 MG/1
90 TABLET ORAL TWICE DAILY
Qty: 60 | Refills: 1 | Status: ACTIVE | COMMUNITY
Start: 2024-04-19 | End: 1900-01-01

## 2024-05-01 ENCOUNTER — APPOINTMENT (OUTPATIENT)
Dept: NEUROSURGERY | Facility: CLINIC | Age: 59
End: 2024-05-01

## 2024-05-01 NOTE — DATA REVIEWED
[de-identified] : \par \par \par ACC: [id number] EXAM: MR BRAIN WAW IC\par ACC: [id number] EXAM: MR ANGIO BRAIN\par \par PROCEDURE DATE: 12/23/2022\par \par \par \par INTERPRETATION: PROCEDURE: MRI Brain with and without contrast. MRA brain without contrast.\par \par INDICATION: Bilateral cerebellar infarctions\par \par TECHNIQUE: MRI brain: Sagittal T1 volumetric series with MPR, axial T2, T2-FLAIR, T2-FFE and diffusion imaging of the brain is obtained. Following the intravenous administration of 7 mL Gadavist contrast material, sagittal T1 volumetric imaging with MPR provided.\par MRA brain: The MRA of the [place] was performed utilizing 3D TOF technique. MIP rotational series are provided. Intravascular flow quantification was performed on the intracranial vessels using gated 2-D phase contrast technique as part of Non-invasive Optimal Vessel analysis (NOVA).\par \par COMPARISON: MRI brain 12/16/2022, CTA head and neck 12/16/2022\par \par FINDINGS: Images are degraded by motion.\par \par The ventricles and sulci are normal in size and configuration.\par \par There is no mass, mass effect, midline shift or extra-axial collection. There is no acute hemorrhage.\par \par There is continued evolution of now subacute right greater than left bilateral cerebellar infarctions. There is associated patchy enhancement within the right cerebellar hemisphere due to subacute infarction. There is no new infarction. There is no significant mass effect. There is associated FLAIR hyperintense signal within the right greater than left inferior cerebellar hemispheres.\par \par There is no susceptibility related signal loss to suggest hemosiderin deposition or calcification.\par \par The sella and suprasellar regions are unremarkable.\par \par There is mucosal thickening the paranasal sinuses.\par \par MRA brain: Images are degraded by motion.\par \par NOVA report is published in PACS. Reported flow rates are listed in units of mL/min:\par \par There is lack of flow related signal within the visualized distal cervical and intracranial right vertebral artery corresponding to findings of recent CTA head and neck of occlusion. There is attenuated caliber of the distal cervical and intracranial left vertebral artery extending to the vertebrobasilar junction. There is no flow related signal within the proximal midportion of the basilar artery with normal flow-related signal of the distal basilar artery at the level of the bilateral PCAs. There was contrast filling of the basilar artery on prior examination. There is flow void within the proximal mid basilar artery on MRI of the brain and therefore the lack of flow related signal on MRA may be artifactual due to motion although underlying stenosis/occlusion cannot be excluded.\par \par There is flow-related signal within the distal bilateral internal carotid arteries without high-grade stenosis. There is flow-related signal within the proximal bilateral middle and anterior cerebral arteries without high-grade stenosis. Hypoplastic left A1 segment.\par \par LEFT:\par \par MCA M1 92\par CYNDIE A1 not assessed\par CYNDIE A2 59\par PCA P2 54\par P-comm 82\par Vertebral 41\par \par RIGHT:\par \par MCA M1 114\par CYNDIE A1 120\par CYNDIE A2 49\par PCA P2 63\par P-comm 74\par Vertebral not visualized\par \par BASILAR: 20\par \par \par \par IMPRESSION:\par \par Examination is degraded by motion.\par \par MRI brain: Evolving subacute right greater than left cerebellar infarctions with patchy enhancement which is typical of subacute infarctions. No new infarction or hemorrhage.\par \par MRA BRAIN: Findings consistent with patient's known right vertebral artery occlusion. Attenuated caliber and flow-related signal within the intracranial left vertebral artery which is similar to prior CTA 1 accounting for differences in technique. Lack of flow related signal within the proximal to mid basilar artery which did demonstrate contrast filling on prior CTA head. Although this may be artifactual due to motion, underlying stenosis/occlusion cannot be excluded.\par \par Findings were discussed with CHANDA Spain by Dr. Marco Ny on 12/23/2022 11:08 AM\par \par --- End of Report ---\par \par

## 2024-05-01 NOTE — HISTORY OF PRESENT ILLNESS
[de-identified] : 57 year old male with past medical history HTN, previous lumbar fusion (March 2022) who originally was brought in by ambulance on 12/22/22 to [facility] after an episode of extreme dizziness and gait instability that caused him to call. It was unclear if he hit his head or LOC but following the fall, he had episode of aphasia, contracture of bilateral hands and confusion. His neighbor called an ambulance. The episode lasted about 1-1.5 hours. CT head was negative on arrival. MRI brain demonstrated multiple acute cerebellar ischemic infarcts. He was started on ASA, plavix and atorvastatin. On 12/19/22, the patient had a cerebral angiogram that demonstrated 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 He was transferred to [facility] for further workup for complex bypass surgery. \par \par - CTA bilateral lower extremities while hospitalized demonstrated LEFT anterior tibialis occlusion, RIGHT anterior tibialis stenosis, possible GIST tumor. GI consulted and he had colonoscopy and biopsies were done. \par - MRA NOVA demonstrated right vertebral artery occlusion. \par - He was discharged home and recommended follow up with general surgery regarding GIST tumor.\par \par He comes to the office today for discussion of possible bypass surgery. \par Patient was unable to presents for hospital follow- up sooner s/t family emergency in [place]. \par \par

## 2025-06-06 NOTE — ED PROVIDER NOTE - DISPOSITION TYPE
[Fatigue] : fatigue [Ecchymoses] : ecchymoses [Bruising] : bruising  [Anemia] : anemia [Negative] : Allergic/Immunologic ELOPED

## (undated) DEVICE — SYS BIOPSY NDL FILE SS STRL 22G